# Patient Record
Sex: MALE | Race: WHITE | Employment: FULL TIME | URBAN - METROPOLITAN AREA
[De-identification: names, ages, dates, MRNs, and addresses within clinical notes are randomized per-mention and may not be internally consistent; named-entity substitution may affect disease eponyms.]

---

## 2017-10-04 ENCOUNTER — OFFICE VISIT (OUTPATIENT)
Dept: INTERNAL MEDICINE CLINIC | Age: 62
End: 2017-10-04

## 2017-10-04 VITALS
DIASTOLIC BLOOD PRESSURE: 74 MMHG | RESPIRATION RATE: 18 BRPM | HEART RATE: 80 BPM | WEIGHT: 203 LBS | HEIGHT: 75 IN | SYSTOLIC BLOOD PRESSURE: 122 MMHG | BODY MASS INDEX: 25.24 KG/M2

## 2017-10-04 DIAGNOSIS — Z23 NEED FOR INFLUENZA VACCINATION: ICD-10-CM

## 2017-10-04 DIAGNOSIS — Z12.83 SCREENING FOR SKIN CANCER: ICD-10-CM

## 2017-10-04 DIAGNOSIS — F51.02 ADJUSTMENT INSOMNIA: ICD-10-CM

## 2017-10-04 DIAGNOSIS — Z23 NEED FOR DIPHTHERIA-TETANUS-PERTUSSIS (TDAP) VACCINE, ADULT/ADOLESCENT: ICD-10-CM

## 2017-10-04 DIAGNOSIS — B00.2 PRIMARY HSV INFECTION OF MOUTH: Primary | ICD-10-CM

## 2017-10-04 DIAGNOSIS — D50.9 IRON DEFICIENCY ANEMIA, UNSPECIFIED: ICD-10-CM

## 2017-10-04 PROCEDURE — 99214 OFFICE O/P EST MOD 30 MIN: CPT | Performed by: INTERNAL MEDICINE

## 2017-10-04 PROCEDURE — 90471 IMMUNIZATION ADMIN: CPT | Performed by: INTERNAL MEDICINE

## 2017-10-04 PROCEDURE — 90472 IMMUNIZATION ADMIN EACH ADD: CPT | Performed by: INTERNAL MEDICINE

## 2017-10-04 PROCEDURE — 90688 IIV4 VACCINE SPLT 0.5 ML IM: CPT | Performed by: INTERNAL MEDICINE

## 2017-10-04 PROCEDURE — 90715 TDAP VACCINE 7 YRS/> IM: CPT | Performed by: INTERNAL MEDICINE

## 2017-10-04 RX ORDER — ZOLPIDEM TARTRATE 5 MG/1
5 TABLET ORAL NIGHTLY PRN
Qty: 30 TABLET | Refills: 0 | Status: SHIPPED | OUTPATIENT
Start: 2017-10-04 | End: 2018-09-04 | Stop reason: SDUPTHER

## 2017-10-04 RX ORDER — VALACYCLOVIR HYDROCHLORIDE 1 G/1
TABLET, FILM COATED ORAL
Qty: 30 TABLET | Refills: 0 | Status: SHIPPED | OUTPATIENT
Start: 2017-10-04 | End: 2018-09-28

## 2017-10-04 RX ORDER — ROSUVASTATIN CALCIUM 10 MG/1
10 TABLET, COATED ORAL DAILY
COMMUNITY

## 2017-10-04 NOTE — MR AVS SNAPSHOT
After Visit Summary             Evonne Cleaning   10/4/2017 11:30 AM   Office Visit    Description:  Male : 1955   Provider:  Mary Jane Vega MD   Department:  [facility]              Your Follow-Up and Future Appointments         Below is a list of your follow-up and future appointments. This may not be a complete list as you may have made appointments directly with providers that we are not aware of or your providers may have made some for you. Please call your providers to confirm appointments. It is important to keep your appointments. Please bring your current insurance card, photo ID, co-pay, and all medication bottles to your appointment. If self-pay, payment is expected at the time of service. Your To-Do List     Follow-Up    Return Physical in Dec.         Information from Your Visit        Department     Name Address Phone Fax    [address] [facility] [phone]      You Were Seen for:         Comments    Primary HSV infection of mouth   [[id number]]         Vital Signs     Blood Pressure Pulse Respirations Height Weight Body Mass Index    122/74 80 18 6' 3\" (1.905 m) 203 lb (92.1 kg) 25.37 kg/m2    Smoking Status                   Never Smoker           Additional Information about your Body Mass Index (BMI)           Your BMI as listed above is considered overweight (25.0-29.9). BMI is an estimate of body fat, calculated from your height and weight. The higher your BMI, the greater your risk of heart disease, high blood pressure, type 2 diabetes, stroke, gallstones, arthritis, sleep apnea, and certain cancers. BMI is not perfect. It may overestimate body fat in athletes and people who are more muscular. If your body fat is high you can improve your BMI by decreasing your calorie intake and becoming more physically active. Learn more at: Heart Health.co.uk             Today's Medication Changes          These changes are accurate as of: 10/4/17 12:04 PM.  If you have any questions, ask your nurse or doctor. STOP taking these medications           warfarin 10 MG tablet   Commonly known as:  COUMADIN   Stopped by:  Caitlin Beasley MD            Where to Get Your Medications      These medications were sent to [address] [facility], [address] [facility]  [address] [facility] [address]    Hours:  24-hours Phone:  [phone]     valACYclovir 1 g tablet    zolpidem 5 MG tablet               Your Current Medications Are              apixaban (ELIQUIS) 5 MG TABS tablet Take 5 mg by mouth 2 times daily    rosuvastatin (CRESTOR) 10 MG tablet Take 10 mg by mouth daily    valACYclovir (VALTREX) 1 g tablet TAKE 2 TABLET BY MOUTH BID    zolpidem (AMBIEN) 5 MG tablet Take 1 tablet by mouth nightly as needed for Sleep    ibuprofen (ADVIL;MOTRIN) 600 MG tablet Take 1 tablet by mouth every 6 hours as needed for Pain    irbesartan (AVAPRO) 150 MG tablet Take 75 mg by mouth daily Indications: Take 1/2 tablet daily     amitriptyline (ELAVIL) 10 MG tablet Take 10 mg by mouth daily. carvedilol (COREG CR) 40 MG CP24 Take 40 mg by mouth daily (with breakfast)     pantoprazole (PROTONIX) 40 MG tablet Take 40 mg by mouth daily. digoxin (LANOXIN) 0.25 MG tablet Take 125 mcg by mouth daily       Allergies           No Known Allergies      We Ordered/Performed the following           Lisbeth Hunter MD     Scheduling Instructions: [facility] - Chau Sosa MD  [address], [place], [address]  Ph: [phone]    One of the many advantages of the [address] is that we all work together closely to make healthcare easy for you.  We have contacted the physician practice to inform them we have referred you. For your convenience, their office should call you within a few days to schedule   an appointment, but if you would like to call them sooner their information is above. Comments: The patient can be scheduled with any member of the group, including the provider with the first available appointments. INFLUENZA, QUADV, 3 YRS AND OLDER, IM, MDV, 0.5ML ([place])     Tdap (age 10y-63y) IM (ADACEL)          Additional Information        Basic Information     Date Of Birth Sex Race Ethnicity Preferred Language    1955 Male White Non-/Non  English      Problem List as of 10/4/2017  Date Reviewed: 12/13/2016                Irritable bowel syndrome with diarrhea    Duran's esophagus without dysplasia    AICD (automatic cardioverter/defibrillator) present    Nonischemic cardiomyopathy ([facility] [address]    Hyperlipidemia, unspecified    Atrial fib/flutter, transient    History of mitral valve repair    Varicose veins    Acne    Iron deficiency anemia, unspecified    Primary HSV infection of mouth    GERD with stricture      Immunizations as of 10/4/2017     Name Date    Hepatitis A 8/23/2016    Influenza Virus Vaccine 12/7/2015, 12/19/2013    Pneumococcal Polysaccharide ([id number]) 12/13/2016    Tdap (Boostrix, Adacel) 7/18/2007    Zoster 12/23/2014      Preventive Care        Date Due    Diabetes Screening 4/23/1993    Tetanus Combination Vaccine (2 - Td) 7/18/2017    Yearly Flu Vaccine (1) 9/1/2017    Colon Cancer Stool Test 12/13/2017    Cholesterol Screening 8/14/2018            [employer] Signup           Our records indicate that you have an active [employer] account. You can view your After Visit Summary by going to [email or web address] and logging in with your [employer] username and password.       If you don't have a [employer] username and password but a parent or

## 2017-10-04 NOTE — PROGRESS NOTES
 Irritable bowel syndrome with diarrhea       No Known Allergies  Outpatient Prescriptions Marked as Taking for the 10/4/17 encounter (Office Visit) with Jennifer Rodriguez MD   Medication Sig Dispense Refill    apixaban (ELIQUIS) 5 MG TABS tablet Take 5 mg by mouth 2 times daily      rosuvastatin (CRESTOR) 10 MG tablet Take 10 mg by mouth daily      valACYclovir (VALTREX) 1 g tablet TAKE 2 TABLET BY MOUTH BID 30 tablet 0    zolpidem (AMBIEN) 5 MG tablet Take 1 tablet by mouth nightly as needed for Sleep 30 tablet 0    ibuprofen (ADVIL;MOTRIN) 600 MG tablet Take 1 tablet by mouth every 6 hours as needed for Pain 20 tablet 0    irbesartan (AVAPRO) 150 MG tablet Take 75 mg by mouth daily Indications: Take 1/2 tablet daily       amitriptyline (ELAVIL) 10 MG tablet Take 10 mg by mouth daily.  carvedilol (COREG CR) 40 MG CP24 Take 40 mg by mouth daily (with breakfast)       pantoprazole (PROTONIX) 40 MG tablet Take 40 mg by mouth daily.  digoxin (LANOXIN) 0.25 MG tablet Take 125 mcg by mouth daily            Review of Systems: 14 systems were negative except of what was stated on HPI    Nursing note and vitals reviewed. Vitals:    10/04/17 1145   BP: 122/74   Pulse: 80   Resp: 18   Weight: 203 lb (92.1 kg)   Height: 6' 3\" (1.905 m)     Wt Readings from Last 3 Encounters:   10/04/17 203 lb (92.1 kg)   07/11/17 205 lb 4 oz (93.1 kg)   12/13/16 205 lb (93 kg)     BP Readings from Last 3 Encounters:   10/04/17 122/74   07/12/17 107/63   12/13/16 122/84     Body mass index is 25.37 kg/(m^2). Constitutional: Patient appears well-developed and well-nourished. No distress. Head: Normocephalic and atraumatic. Neck: Normal range of motion. Neck supple. No thyroidmegaly. Cardiovascular: Normal rate, regular rhythm, normal heart sounds and intact distal pulses. Pulmonary/Chest: Effort normal and breath sounds normal. No stridor. No respiratory distress. No wheezes and no rales. Abdominal: Soft.  Bowel

## 2017-10-31 ENCOUNTER — OFFICE VISIT (OUTPATIENT)
Dept: INTERNAL MEDICINE CLINIC | Age: 62
End: 2017-10-31

## 2017-10-31 VITALS
RESPIRATION RATE: 18 BRPM | SYSTOLIC BLOOD PRESSURE: 138 MMHG | WEIGHT: 208 LBS | HEIGHT: 75 IN | HEART RATE: 78 BPM | DIASTOLIC BLOOD PRESSURE: 86 MMHG | BODY MASS INDEX: 25.86 KG/M2

## 2017-10-31 DIAGNOSIS — Z01.818 PREOP EXAM FOR INTERNAL MEDICINE: ICD-10-CM

## 2017-10-31 DIAGNOSIS — Z00.00 ANNUAL PHYSICAL EXAM: Primary | ICD-10-CM

## 2017-10-31 DIAGNOSIS — I42.8 NONISCHEMIC CARDIOMYOPATHY (HCC): ICD-10-CM

## 2017-10-31 DIAGNOSIS — I48.20 CHRONIC ATRIAL FIBRILLATION (HCC): ICD-10-CM

## 2017-10-31 DIAGNOSIS — Z98.890 HISTORY OF MITRAL VALVE REPAIR: ICD-10-CM

## 2017-10-31 DIAGNOSIS — K04.7 INFECTED TOOTH: ICD-10-CM

## 2017-10-31 PROCEDURE — 93000 ELECTROCARDIOGRAM COMPLETE: CPT | Performed by: INTERNAL MEDICINE

## 2017-10-31 PROCEDURE — 99244 OFF/OP CNSLTJ NEW/EST MOD 40: CPT | Performed by: INTERNAL MEDICINE

## 2017-10-31 NOTE — PROGRESS NOTES
No    Patient Active Problem List   Diagnosis    Atrial fib/flutter, transient    History of mitral valve repair    Varicose veins    Acne    Iron deficiency anemia    Primary HSV infection of mouth    GERD with stricture    AICD (automatic cardioverter/defibrillator) present    Nonischemic cardiomyopathy ([address]    Hyperlipidemia, unspecified    Duran's esophagus without dysplasia    Irritable bowel syndrome with diarrhea       Past Medical History:   Diagnosis Date    Acne     AICD (automatic cardioverter/defibrillator) present     Anemia     Atrial fib/flutter, transient     ED (erectile dysfunction)     ED (erectile dysfunction) 4/10/2012    GERD (gastroesophageal reflux disease)     Heart disease     Hyperlipidemia 12/19/2013    IBS (irritable bowel syndrome) 8/20/2015    MVP (mitral valve prolapse)     Primary HSV infection of mouth      Past Surgical History:   Procedure Laterality Date    CARDIAC VALVE SURGERY  1999    COLONOSCOPY  6/25/12    HEMORRHOID SURGERY  2015, 2016    hemorrhoid banding    VENTRICULAR ABLATION SURGERY  12/30/2015     Family History   Problem Relation Age of Onset    Heart Disease Mother      arrythmia    Heart Disease Father      MI, Mitral Valve, rheumatic fever    Heart Disease Brother      MVP and cardioverted     Social History     Social History    Marital status: Single     Spouse name: N/A    Number of children: N/A    Years of education: N/A     Occupational History    Not on file. Social History Main Topics    Smoking status: Never Smoker    Smokeless tobacco: Never Used    Alcohol use Yes      Comment: SOCIALLY    Drug use: No    Sexual activity: Yes     Partners: Female     Other Topics Concern    Not on file     Social History Narrative    No narrative on file       Review of Systems  A comprehensive review of systems was negative except for what was noted in the HPI.      Physical Exam   Constitutional: He is oriented to person, place, and time. He appears well-developed and well-nourished. No distress. HENT:   Head: Normocephalic and atraumatic. Mouth/Throat: Uvula is midline, oropharynx is clear and moist and mucous membranes are normal.   Eyes: Conjunctivae and EOM are normal. Pupils are equal, round, and reactive to light. Neck: Trachea normal and normal range of motion. Neck supple. No JVD present. Carotid bruit is not present. No mass and no thyromegaly present. Cardiovascular: Normal rate, regular rhythm, normal heart sounds and intact distal pulses. Exam reveals no gallop and no friction rub. No murmur heard. Pulmonary/Chest: Effort normal and breath sounds normal. No respiratory distress. He has no wheezes. He has no rales. Abdominal: Soft. Normal aorta and bowel sounds are normal. He exhibits no distension and no mass. There is no hepatosplenomegaly. No tenderness. Musculoskeletal: He exhibits no edema and no tenderness. Neurological: He is alert and oriented to person, place, and time. He has normal strength. No cranial nerve deficit or sensory deficit. Coordination and gait normal.   Skin: Skin is warm and dry. No rash noted. No erythema. Psychiatric: He has a normal mood and affect.  His behavior is normal.       Lab Results   Component Value Date    LABMICR YES 07/11/2017     Lab Results   Component Value Date     07/11/2017    K 4.1 07/11/2017    CL 99 07/11/2017    CO2 27 07/11/2017    BUN 10 07/11/2017    CREATININE 0.7 (L) 07/11/2017    GLUCOSE 106 (H) 07/11/2017    CALCIUM 9.2 07/11/2017     Lab Results   Component Value Date    LDLCALC 115 08/14/2013     Lab Results   Component Value Date    ALT 18 07/11/2017    AST 28 07/11/2017     Lab Results   Component Value Date    T4FREE 1.1 08/20/2015    T4FREE 0.98 11/21/2013     Lab Results   Component Value Date    WBC 8.7 07/11/2017    HGB 12.0 (L) 07/11/2017    HCT 36.0 (L) 07/11/2017    MCV 87.8 07/11/2017     07/11/2017     Lab Results   Component Value Date    INR 3.3 11/04/2013      Lab Results   Component Value Date    PSA 1.34 12/13/2016    PSA 1.51 12/07/2015    PSA 1.30 12/19/2013      No results found for: LABURIC        Assessment:       57 y.o. patient with planned surgery as above. SURGERY SPECIFIC RISK:  none  Known risk factors for perioperative complications: Chronic A. Fib with defibrillator  Current medications which may produce withdrawal symptoms if withheld perioperatively: none      Plan:     1. Preoperative workup as follows: ECG  2. Change in medication regimen before surgery: no Eliquis 2 days prior to surgery per cardiologist  3. Prophylaxis for cardiac events with perioperative beta-blockers: Not indicated  ACC/AHA indications for pre-operative beta-blocker use:    · Vascular surgery with history of postitive stress test  · Intermediate or high risk surgery with history of CAD   · Intermediate or high risk surgery with multiple clinical predictors of CAD- 2 of the following: history of compensated or prior heart failure, history of cerebrovascular disease, DM, or renal insufficiency    Routine administration of higher-dose, long-acting metoprolol in beta-blockernaïve patients on the day of surgery, and in the absence of dose titration is associated with an overall increase in mortality. Beta-blockers should be started days to weeks prior to surgery and titrated to pulse < 70.  4. Deep vein thrombosis prophylaxis: regimen to be chosen by surgical team  5.  No contraindications to planned surgery if cleared by cardiologist    Barry Walton was seen today for pre-op exam.    Diagnoses and all orders for this visit:    Preop exam for internal medicine  -     EKG 12 Lead    Infected tooth    Chronic atrial fibrillation (HCC)  -     EKG 12 Lead    History of mitral valve repair  -     EKG 12 Lead    Nonischemic cardiomyopathy ([facility] [address]  -     EKG 12 Lead              ALDO PAN M.D.  [facility]  Office: [phone]

## 2017-10-31 NOTE — PATIENT INSTRUCTIONS
Preventive plan of care for Vincent Barrientos        10/31/2017           Preventive Measures Status       Recommendations for screening       Colon Cancer Screen  Colonoscopy Test is due 2022   Diabetes Screen  Glucose (mg/dL)   Date Value   07/11/2017 106 (H)    Repeat yearly   Cholesterol Screen  Lab Results   Component Value Date    LDLCALC 115 08/14/2013    Repeat yearly   Aspirin for Cardiovascular Prevention   No Contraindicated due to Eliquis   Weight: Body mass index is 26 kg/m².   6' 3\" (1.905 m)208 lb (94.3 kg)  Your BMI is 25 or greater, which indicates that you are overweight   Living Will: Yes Copy requested    Recommended Immunizations    Immunization History   Administered Date(s) Administered    Hepatitis A 08/23/2016    Influenza Virus Vaccine 12/19/2013, 12/07/2015    Influenza, Shandra Nur, 3 Years and older, IM 10/04/2017    Pneumococcal Polysaccharide ([id number]) 12/13/2016    Tdap (Boostrix, Adacel) 07/18/2007, 10/04/2017    Zoster 12/23/2014    Influenza vaccine:  recommended every fall         Other Recommendations ·   See a dentist every 6 months  · Try to get at least 30 minutes of exercise 3-5 days per week  · Always wear a seat belt when traveling in a car  · Always wear a helmet when riding a bicycle or motorcycle  · When exposed to the sun, use a sunscreen that protects against both UVA and UVB radiation with an SPF of 30 or greater- reapply every 2 to 3 hours or after sweating, drying off with a towel, or swimming

## 2017-10-31 NOTE — PROGRESS NOTES
[facility]  History and Physical  Esperanza Crane M.D. Maria Esther Etienne  YOB: 1955    Date of Service:  10/31/2017    Chief Complaint:   Maria Esther Etienne is a 57 y.o. male who presents for   Chief Complaint   Patient presents with    Pre-op Exam     Tooth Extraction on 11/6/17 Nathan Szymanski        HPI: Here for Annual Physical and Follow up.     Lab Results   Component Value Date    LABMICR YES 07/11/2017     Lab Results   Component Value Date     07/11/2017    K 4.1 07/11/2017    CL 99 07/11/2017    CO2 27 07/11/2017    BUN 10 07/11/2017    CREATININE 0.7 (L) 07/11/2017    GLUCOSE 106 (H) 07/11/2017    CALCIUM 9.2 07/11/2017     Lab Results   Component Value Date    LDLCALC 115 08/14/2013     Lab Results   Component Value Date    ALT 18 07/11/2017    AST 28 07/11/2017     Lab Results   Component Value Date    T4FREE 1.1 08/20/2015    T4FREE 0.98 11/21/2013     Lab Results   Component Value Date    WBC 8.7 07/11/2017    HGB 12.0 (L) 07/11/2017    HCT 36.0 (L) 07/11/2017    MCV 87.8 07/11/2017     07/11/2017     Lab Results   Component Value Date    INR 3.3 11/04/2013      Lab Results   Component Value Date    PSA 1.34 12/13/2016    PSA 1.51 12/07/2015    PSA 1.30 12/19/2013      No results found for: LABURIC     Wt Readings from Last 3 Encounters:   10/31/17 208 lb (94.3 kg)   10/04/17 203 lb (92.1 kg)   07/11/17 205 lb 4 oz (93.1 kg)     BP Readings from Last 3 Encounters:   10/31/17 138/86   10/04/17 122/74   07/12/17 107/63       Patient Active Problem List   Diagnosis    Atrial fib/flutter, transient    History of mitral valve repair    Varicose veins    Acne    Iron deficiency anemia    Primary HSV infection of mouth    GERD with stricture    AICD (automatic cardioverter/defibrillator) present    Nonischemic cardiomyopathy ([address]    Hyperlipidemia, unspecified    Duran's esophagus without dysplasia    Irritable bowel syndrome with diarrhea       No Known Allergies  Outpatient Prescriptions Marked as Taking for the 10/31/17 encounter (Office Visit) with Kateryna Beasley MD   Medication Sig Dispense Refill    apixaban (ELIQUIS) 5 MG TABS tablet Take 5 mg by mouth 2 times daily      rosuvastatin (CRESTOR) 10 MG tablet Take 10 mg by mouth daily      valACYclovir (VALTREX) 1 g tablet TAKE 2 TABLET BY MOUTH BID 30 tablet 0    zolpidem (AMBIEN) 5 MG tablet Take 1 tablet by mouth nightly as needed for Sleep 30 tablet 0    irbesartan (AVAPRO) 150 MG tablet Take 75 mg by mouth daily Indications: Take 1/2 tablet daily       amitriptyline (ELAVIL) 10 MG tablet Take 10 mg by mouth daily.  carvedilol (COREG CR) 40 MG CP24 Take 40 mg by mouth daily (with breakfast)       pantoprazole (PROTONIX) 40 MG tablet Take 40 mg by mouth daily.  digoxin (LANOXIN) 0.25 MG tablet Take 125 mcg by mouth daily          Past Medical History:   Diagnosis Date    Acne     AICD (automatic cardioverter/defibrillator) present     Anemia     Atrial fib/flutter, transient     ED (erectile dysfunction)     ED (erectile dysfunction) 4/10/2012    GERD (gastroesophageal reflux disease)     Heart disease     Hyperlipidemia 12/19/2013    IBS (irritable bowel syndrome) 8/20/2015    MVP (mitral valve prolapse)     Primary HSV infection of mouth      Past Surgical History:   Procedure Laterality Date    CARDIAC VALVE SURGERY  1999    COLONOSCOPY  6/25/12    HEMORRHOID SURGERY  2015, 2016    hemorrhoid banding    VENTRICULAR ABLATION SURGERY  12/30/2015     Family History   Problem Relation Age of Onset    Heart Disease Mother      arrythmia    Heart Disease Father      MI, Mitral Valve, rheumatic fever    Heart Disease Brother      MVP and cardioverted     Social History     Social History    Marital status: Single     Spouse name: N/A    Number of children: N/A    Years of education: N/A     Occupational History    Not on file.      Social History Main Topics    Smoking

## 2017-11-06 ENCOUNTER — HOSPITAL ENCOUNTER (OUTPATIENT)
Dept: SURGERY | Age: 62
Discharge: HOME | End: 2017-11-06
Attending: ORAL & MAXILLOFACIAL SURGERY | Admitting: ORAL & MAXILLOFACIAL SURGERY

## 2017-11-06 VITALS
WEIGHT: 208 LBS | SYSTOLIC BLOOD PRESSURE: 114 MMHG | HEIGHT: 76 IN | OXYGEN SATURATION: 99 % | DIASTOLIC BLOOD PRESSURE: 64 MMHG | TEMPERATURE: 97.2 F | BODY MASS INDEX: 25.33 KG/M2 | RESPIRATION RATE: 16 BRPM | HEART RATE: 52 BPM

## 2017-11-06 DIAGNOSIS — K08.9 DISORDER OF TEETH OR SUPPORTING STRUCTURES: ICD-10-CM

## 2017-11-06 RX ORDER — MORPHINE SULFATE 2 MG/ML
1 INJECTION, SOLUTION INTRAMUSCULAR; INTRAVENOUS EVERY 5 MIN PRN
Status: DISCONTINUED | OUTPATIENT
Start: 2017-11-06 | End: 2017-11-07 | Stop reason: HOSPADM

## 2017-11-06 RX ORDER — HYDRALAZINE HYDROCHLORIDE 20 MG/ML
5 INJECTION INTRAMUSCULAR; INTRAVENOUS EVERY 10 MIN PRN
Status: DISCONTINUED | OUTPATIENT
Start: 2017-11-06 | End: 2017-11-07 | Stop reason: HOSPADM

## 2017-11-06 RX ORDER — SODIUM CHLORIDE 0.9 % (FLUSH) 0.9 %
10 SYRINGE (ML) INJECTION EVERY 12 HOURS SCHEDULED
Status: DISCONTINUED | OUTPATIENT
Start: 2017-11-06 | End: 2017-11-07 | Stop reason: HOSPADM

## 2017-11-06 RX ORDER — AMOXICILLIN 500 MG/1
2000 CAPSULE ORAL ONCE
COMMUNITY
End: 2017-11-06 | Stop reason: HOSPADM

## 2017-11-06 RX ORDER — OXYCODONE HYDROCHLORIDE 5 MG/1
5 TABLET ORAL PRN
Status: ACTIVE | OUTPATIENT
Start: 2017-11-06 | End: 2017-11-06

## 2017-11-06 RX ORDER — MEPERIDINE HYDROCHLORIDE 50 MG/ML
12.5 INJECTION INTRAMUSCULAR; INTRAVENOUS; SUBCUTANEOUS EVERY 5 MIN PRN
Status: DISCONTINUED | OUTPATIENT
Start: 2017-11-06 | End: 2017-11-07 | Stop reason: HOSPADM

## 2017-11-06 RX ORDER — METOCLOPRAMIDE HYDROCHLORIDE 5 MG/ML
10 INJECTION INTRAMUSCULAR; INTRAVENOUS
Status: ACTIVE | OUTPATIENT
Start: 2017-11-06 | End: 2017-11-06

## 2017-11-06 RX ORDER — PROMETHAZINE HYDROCHLORIDE 25 MG/ML
6.25 INJECTION, SOLUTION INTRAMUSCULAR; INTRAVENOUS
Status: ACTIVE | OUTPATIENT
Start: 2017-11-06 | End: 2017-11-06

## 2017-11-06 RX ORDER — LABETALOL HYDROCHLORIDE 5 MG/ML
5 INJECTION, SOLUTION INTRAVENOUS EVERY 10 MIN PRN
Status: DISCONTINUED | OUTPATIENT
Start: 2017-11-06 | End: 2017-11-07 | Stop reason: HOSPADM

## 2017-11-06 RX ORDER — OXYMETAZOLINE HYDROCHLORIDE 0.05 G/100ML
2 SPRAY NASAL
Status: COMPLETED | OUTPATIENT
Start: 2017-11-06 | End: 2017-11-06

## 2017-11-06 RX ORDER — OXYCODONE HYDROCHLORIDE 5 MG/1
10 TABLET ORAL PRN
Status: ACTIVE | OUTPATIENT
Start: 2017-11-06 | End: 2017-11-06

## 2017-11-06 RX ORDER — LIDOCAINE HYDROCHLORIDE 10 MG/ML
1 INJECTION, SOLUTION EPIDURAL; INFILTRATION; INTRACAUDAL; PERINEURAL
Status: ACTIVE | OUTPATIENT
Start: 2017-11-06 | End: 2017-11-06

## 2017-11-06 RX ORDER — MINOCYCLINE HYDROCHLORIDE 50 MG/1
CAPSULE ORAL
Refills: 5 | COMMUNITY
Start: 2017-10-26 | End: 2017-11-06 | Stop reason: HOSPADM

## 2017-11-06 RX ORDER — SODIUM CHLORIDE, SODIUM LACTATE, POTASSIUM CHLORIDE, CALCIUM CHLORIDE 600; 310; 30; 20 MG/100ML; MG/100ML; MG/100ML; MG/100ML
INJECTION, SOLUTION INTRAVENOUS CONTINUOUS
Status: DISCONTINUED | OUTPATIENT
Start: 2017-11-06 | End: 2017-11-07 | Stop reason: HOSPADM

## 2017-11-06 RX ORDER — DIPHENHYDRAMINE HYDROCHLORIDE 50 MG/ML
12.5 INJECTION INTRAMUSCULAR; INTRAVENOUS
Status: ACTIVE | OUTPATIENT
Start: 2017-11-06 | End: 2017-11-06

## 2017-11-06 RX ORDER — SODIUM CHLORIDE 0.9 % (FLUSH) 0.9 %
10 SYRINGE (ML) INJECTION PRN
Status: DISCONTINUED | OUTPATIENT
Start: 2017-11-06 | End: 2017-11-07 | Stop reason: HOSPADM

## 2017-11-06 RX ADMIN — SODIUM CHLORIDE, SODIUM LACTATE, POTASSIUM CHLORIDE, CALCIUM CHLORIDE: 600; 310; 30; 20 INJECTION, SOLUTION INTRAVENOUS at 10:01

## 2017-11-06 RX ADMIN — OXYMETAZOLINE HYDROCHLORIDE 2 SPRAY: 0.05 SPRAY NASAL at 10:03

## 2017-11-06 ASSESSMENT — PAIN SCALES - GENERAL
PAINLEVEL_OUTOF10: 0

## 2017-11-06 ASSESSMENT — PAIN - FUNCTIONAL ASSESSMENT: PAIN_FUNCTIONAL_ASSESSMENT: 0-10

## 2017-11-06 NOTE — BRIEF OP NOTE
Brief Postoperative Note    Leonela Chapa  YOB: 1955  [id number]    Pre-operative Diagnosis: non salvageable 15    Post-operative Diagnosis: Same    Procedure: surgical extraction 15    Anesthesia: General    Surgeons/Assistants: rios    Estimated Blood Loss: less than 50     Complications: None    Specimens: Was Not Obtained    Findings: unremarkable    Electronically signed by Tevin Marcos MD on 11/6/2017 at 11:15 AM

## 2017-11-06 NOTE — H&P
I have reviewed the history and physical and examined the patient and find no relevant changes. I have reviewed with the patient and/or family the risks, benefits, and alternatives to the procedure.

## 2017-11-06 NOTE — ANESTHESIA PRE-OP
injection 5 mg  5 mg Intravenous Q10 Min PRN Isidro Bashir MD        hydrALAZINE (APRESOLINE) injection 5 mg  5 mg Intravenous Q10 Min PRN Isidro Bashir MD        meperidine (DEMEROL) injection 12.5 mg  12.5 mg Intravenous Q5 Min PRN Isidro Bashir MD        oxymetazoline (AFRIN) 0.05 % nasal spray 2 spray  2 spray Each Nare On Call to Via Thierry Umaña MD           Allergies:  No Known Allergies    Problem List:    Patient Active Problem List   Diagnosis Code    Chronic atrial fibrillation ([facility] [address] I48.2    History of mitral valve repair Z98.890    Varicose veins I83.90    Acne L70.9    Iron deficiency anemia D50.9    Primary HSV infection of mouth B00.2    GERD with stricture K21.9, K22.2    AICD (automatic cardioverter/defibrillator) present Z95.810    Nonischemic cardiomyopathy ([facility] [address] I42.8    Hyperlipidemia, unspecified E78.5    Duran's esophagus without dysplasia K22.70    Irritable bowel syndrome with diarrhea K58.0       Past Medical History:        Diagnosis Date    Acne     AICD (automatic cardioverter/defibrillator) present     Anemia     Atrial fibrillation ([facility] [address]     ED (erectile dysfunction) 4/10/2012    GERD (gastroesophageal reflux disease)     Heart disease     IBS (irritable bowel syndrome) 8/20/2015    MVP (mitral valve prolapse)     PONV (postoperative nausea and vomiting)     Primary HSV infection of mouth        Past Surgical History:        Procedure Laterality Date    CARDIAC DEFIBRILLATOR PLACEMENT  2005    pacer combination    CARDIAC VALVE SURGERY  1999    COLONOSCOPY  6/25/12    HEMORRHOID SURGERY  2015, 2016    hemorrhoid banding    VENTRICULAR ABLATION SURGERY  2002 X 2, 2003 X 2, 2015 X1       Social History:    Social History   Substance Use Topics    Smoking status: Never Smoker    Smokeless tobacco: Never Used    Alcohol use 0.0 - 0.6 oz/week      Comment: SOCIALLY                                Counseling given: Not Answered      Vital Signs (Current):   Vitals:    11/06/17 0948   BP: 135/85   Pulse: 71   Resp: 20   Temp: 98.1 °F (36.7 °C)   SpO2: 100%   Weight: 208 lb (94.3 kg)   Height: 6' 4\" (1.93 m)                                              BP Readings from Last 3 Encounters:   11/06/17 135/85   10/31/17 138/86   10/04/17 122/74       NPO Status: Time of last liquid consumption: 0900                        Time of last solid consumption: 2000                        Date of last liquid consumption: 11/06/17 (sip water with meds)                        Date of last solid food consumption: 11/05/17    BMI:   Wt Readings from Last 3 Encounters:   11/06/17 208 lb (94.3 kg)   11/01/17 208 lb (94.3 kg)   10/31/17 208 lb (94.3 kg)     Body mass index is 25.32 kg/m². Anesthesia Evaluation  Patient summary reviewed and Nursing notes reviewed history of anesthetic complications:   Airway: Mallampati: II  TM distance: >3 FB   Neck ROM: full  Mouth opening: > = 3 FB Dental:          Pulmonary:negative ROS                              Cardiovascular:    (+) valvular problems/murmurs (s/p mitral valve replacement with resultant afib and AICD insertion):, pacemaker: AICD,                   Neuro/Psych:   {neg ROS              GI/Hepatic/Renal:   (+) GERD:,           Endo/Other: negative ROS                    Abdominal:           Vascular:                                      Anesthesia Plan      general     ASA 1    (20-year-old male presents for extraction of #15 tooth. Plan general anesthesia with ASA standard monitors. Questions answered. Patient agreeable with anesthetic plan.  )  Induction: intravenous. Anesthetic plan and risks discussed with patient. Plan discussed with CRNA.     Attending anesthesiologist reviewed and agrees with Pre Eval content        Hien Venegas MD   11/6/2017

## 2017-11-06 NOTE — ANESTHESIA POST-OP
Postoperative Anesthesia Note    Name:    Radha Rivera  MRN:      [id number]    Patient Vitals for the past 12 hrs:   BP Temp Temp src Pulse Resp SpO2 Height Weight   11/06/17 1143 114/64 - - 52 16 99 % - -   11/06/17 1130 (!) 106/55 97.2 °F (36.2 °C) - 58 16 95 % - -   11/06/17 1122 (!) 104/55 - - 53 16 93 % - -   11/06/17 1117 114/61 - - 61 15 90 % - -   11/06/17 1112 121/68 96.8 °F (36 °C) Temporal 60 16 92 % - -   11/06/17 0948 135/85 98.1 °F (36.7 °C) - 71 20 100 % 6' 4\" (1.93 m) 208 lb (94.3 kg)        LABS:    CBC  Lab Results   Component Value Date/Time    WBC 8.7 07/11/2017 10:02 PM    HGB 12.0 (L) 07/11/2017 10:02 PM    HCT 36.0 (L) 07/11/2017 10:02 PM     07/11/2017 10:02 PM     RENAL  Lab Results   Component Value Date/Time     07/11/2017 10:02 PM    K 4.1 07/11/2017 10:02 PM    CL 99 07/11/2017 10:02 PM    CO2 27 07/11/2017 10:02 PM    BUN 10 07/11/2017 10:02 PM    CREATININE 0.7 (L) 07/11/2017 10:02 PM    GLUCOSE 106 (H) 07/11/2017 10:02 PM     COAGS  Lab Results   Component Value Date/Time    INR 3.3 11/04/2013 01:09 PM       Intake & Output: In: [address] [[address]; [address]]  Out: -     Nausea & Vomiting:  No    Level of Consciousness:  Awake    Pain Assessment:  Adequate analgesia    Anesthesia Complications:  No apparent anesthetic complications    SUMMARY      Vital signs stable  OK to discharge from Stage I post anesthesia care.   Care transferred from Anesthesiology department on discharge from perioperative area

## 2018-09-04 ENCOUNTER — OFFICE VISIT (OUTPATIENT)
Dept: INTERNAL MEDICINE CLINIC | Age: 63
End: 2018-09-04

## 2018-09-04 VITALS
SYSTOLIC BLOOD PRESSURE: 110 MMHG | DIASTOLIC BLOOD PRESSURE: 80 MMHG | HEIGHT: 76 IN | BODY MASS INDEX: 24.18 KG/M2 | WEIGHT: 198.6 LBS | OXYGEN SATURATION: 97 % | HEART RATE: 66 BPM

## 2018-09-04 DIAGNOSIS — F51.02 ADJUSTMENT INSOMNIA: ICD-10-CM

## 2018-09-04 DIAGNOSIS — J18.9 PNEUMONIA OF LEFT LOWER LOBE DUE TO INFECTIOUS ORGANISM: Primary | ICD-10-CM

## 2018-09-04 PROCEDURE — 4040F PNEUMOC VAC/ADMIN/RCVD: CPT | Performed by: INTERNAL MEDICINE

## 2018-09-04 PROCEDURE — 99213 OFFICE O/P EST LOW 20 MIN: CPT | Performed by: INTERNAL MEDICINE

## 2018-09-04 PROCEDURE — 1036F TOBACCO NON-USER: CPT | Performed by: INTERNAL MEDICINE

## 2018-09-04 PROCEDURE — 1123F ACP DISCUSS/DSCN MKR DOCD: CPT | Performed by: INTERNAL MEDICINE

## 2018-09-04 PROCEDURE — 1101F PT FALLS ASSESS-DOCD LE1/YR: CPT | Performed by: INTERNAL MEDICINE

## 2018-09-04 PROCEDURE — G8420 CALC BMI NORM PARAMETERS: HCPCS | Performed by: INTERNAL MEDICINE

## 2018-09-04 PROCEDURE — 3017F COLORECTAL CA SCREEN DOC REV: CPT | Performed by: INTERNAL MEDICINE

## 2018-09-04 PROCEDURE — G8427 DOCREV CUR MEDS BY ELIG CLIN: HCPCS | Performed by: INTERNAL MEDICINE

## 2018-09-04 RX ORDER — PROMETHAZINE HYDROCHLORIDE AND CODEINE PHOSPHATE 6.25; 1 MG/5ML; MG/5ML
5 SYRUP ORAL EVERY 4 HOURS PRN
Qty: 180 ML | Refills: 0 | Status: SHIPPED | OUTPATIENT
Start: 2018-09-04 | End: 2018-09-11

## 2018-09-04 RX ORDER — ZOLPIDEM TARTRATE 5 MG/1
5 TABLET ORAL NIGHTLY PRN
Qty: 30 TABLET | Refills: 0 | Status: SHIPPED | OUTPATIENT
Start: 2018-09-04 | End: 2019-10-03 | Stop reason: SDUPTHER

## 2018-09-04 NOTE — PROGRESS NOTES
Medication Sig Dispense Refill    apixaban (ELIQUIS) 5 MG TABS tablet Take 5 mg by mouth 2 times daily      rosuvastatin (CRESTOR) 10 MG tablet Take 10 mg by mouth daily      valACYclovir (VALTREX) 1 g tablet TAKE 2 TABLET BY MOUTH BID 30 tablet 0    zolpidem (AMBIEN) 5 MG tablet Take 1 tablet by mouth nightly as needed for Sleep 30 tablet 0    irbesartan (AVAPRO) 150 MG tablet Take 75 mg by mouth daily Indications: Take 1/2 tablet daily       amitriptyline (ELAVIL) 10 MG tablet Take 10 mg by mouth daily.  carvedilol (COREG CR) 40 MG CP24 Take 40 mg by mouth daily (with breakfast)       pantoprazole (PROTONIX) 40 MG tablet Take 40 mg by mouth daily.  digoxin (LANOXIN) 0.25 MG tablet Take 125 mcg by mouth daily            Review of Systems: 14 systems were negative except of what was stated on HPI    Nursing note and vitals reviewed. Vitals:    09/04/18 1323   BP: 110/80   Site: Right Arm   Position: Sitting   Cuff Size: Medium Adult   Pulse: 66   SpO2: 97%   Weight: 198 lb 9.6 oz (90.1 kg)   Height: 6' 4\" (1.93 m)     Wt Readings from Last 3 Encounters:   09/04/18 198 lb 9.6 oz (90.1 kg)   11/06/17 208 lb (94.3 kg)   11/01/17 208 lb (94.3 kg)     BP Readings from Last 3 Encounters:   09/04/18 110/80   11/06/17 114/64   10/31/17 138/86     Body mass index is 24.17 kg/m². Constitutional: Patient appears well-developed and well-nourished. No distress. Head: Normocephalic and atraumatic. Neck: Normal range of motion. Neck supple. No thyroidmegaly. Cardiovascular: Normal rate, regular rhythm, normal heart sounds and intact distal pulses. Pulmonary/Chest: Effort normal and breath sounds normal. No stridor. No respiratory distress. No wheezes and no rales. Abdominal: Soft. Bowel sounds are normal. No distension and no mass. No tenderness. No rebound and no guarding. Musculoskeletal: No edema and no tenderness. Skin: No rash or erythema. Psychiatric: Normal mood and affect.  Behavior is normal.     Assessment/Plan:  Merna Mora was seen today for follow-up from hospital.    Diagnoses and all orders for this visit:    Pneumonia of left lower lobe due to infectious organism ([address]  -     promethazine-codeine (PHENERGAN WITH CODEINE) 6.25-10 MG/5ML syrup; Take 5 mLs by mouth every 4 hours as needed for Cough for up to 7 days. .    Adjustment insomnia  -     zolpidem (AMBIEN) 5 MG tablet; Take 1 tablet by mouth nightly as needed for Sleep for up to 350 days. .        Return Welcome to [facility] within 1 month.

## 2018-09-05 ENCOUNTER — TELEPHONE (OUTPATIENT)
Dept: INTERNAL MEDICINE CLINIC | Age: 63
End: 2018-09-05

## 2018-09-05 NOTE — TELEPHONE ENCOUNTER
Patient wants to know if should take mucinex along with his prescription to help with mucous? Also should he be on a zpak?

## 2018-09-13 ENCOUNTER — OFFICE VISIT (OUTPATIENT)
Dept: INTERNAL MEDICINE CLINIC | Age: 63
End: 2018-09-13

## 2018-09-13 VITALS
DIASTOLIC BLOOD PRESSURE: 84 MMHG | WEIGHT: 197.6 LBS | HEIGHT: 76 IN | BODY MASS INDEX: 24.06 KG/M2 | SYSTOLIC BLOOD PRESSURE: 118 MMHG | OXYGEN SATURATION: 93 % | HEART RATE: 76 BPM

## 2018-09-13 DIAGNOSIS — J98.01 POST-INFECTION BRONCHOSPASM: ICD-10-CM

## 2018-09-13 DIAGNOSIS — Z23 NEED FOR STREPTOCOCCUS PNEUMONIAE VACCINATION: ICD-10-CM

## 2018-09-13 DIAGNOSIS — Z23 NEED FOR SHINGLES VACCINE: ICD-10-CM

## 2018-09-13 DIAGNOSIS — Z23 NEED FOR INFLUENZA VACCINATION: ICD-10-CM

## 2018-09-13 DIAGNOSIS — Z00.00 ROUTINE GENERAL MEDICAL EXAMINATION AT A HEALTH CARE FACILITY: ICD-10-CM

## 2018-09-13 DIAGNOSIS — J02.9 PHARYNGITIS, UNSPECIFIED ETIOLOGY: ICD-10-CM

## 2018-09-13 DIAGNOSIS — Z00.00 MEDICARE WELCOME EXAM: Primary | ICD-10-CM

## 2018-09-13 LAB — S PYO AG THROAT QL: NORMAL

## 2018-09-13 PROCEDURE — G8420 CALC BMI NORM PARAMETERS: HCPCS | Performed by: INTERNAL MEDICINE

## 2018-09-13 PROCEDURE — G8427 DOCREV CUR MEDS BY ELIG CLIN: HCPCS | Performed by: INTERNAL MEDICINE

## 2018-09-13 PROCEDURE — 4040F PNEUMOC VAC/ADMIN/RCVD: CPT | Performed by: INTERNAL MEDICINE

## 2018-09-13 PROCEDURE — 87880 STREP A ASSAY W/OPTIC: CPT | Performed by: INTERNAL MEDICINE

## 2018-09-13 PROCEDURE — 99213 OFFICE O/P EST LOW 20 MIN: CPT | Performed by: INTERNAL MEDICINE

## 2018-09-13 PROCEDURE — 1123F ACP DISCUSS/DSCN MKR DOCD: CPT | Performed by: INTERNAL MEDICINE

## 2018-09-13 PROCEDURE — 1101F PT FALLS ASSESS-DOCD LE1/YR: CPT | Performed by: INTERNAL MEDICINE

## 2018-09-13 PROCEDURE — 1036F TOBACCO NON-USER: CPT | Performed by: INTERNAL MEDICINE

## 2018-09-13 PROCEDURE — G0402 INITIAL PREVENTIVE EXAM: HCPCS | Performed by: INTERNAL MEDICINE

## 2018-09-13 PROCEDURE — 3017F COLORECTAL CA SCREEN DOC REV: CPT | Performed by: INTERNAL MEDICINE

## 2018-09-13 RX ORDER — ALBUTEROL SULFATE 90 UG/1
2 AEROSOL, METERED RESPIRATORY (INHALATION) EVERY 6 HOURS PRN
Qty: 1 INHALER | Refills: 0 | Status: SHIPPED | OUTPATIENT
Start: 2018-09-13

## 2018-09-13 ASSESSMENT — ANXIETY QUESTIONNAIRES: GAD7 TOTAL SCORE: 0

## 2018-09-13 ASSESSMENT — PATIENT HEALTH QUESTIONNAIRE - PHQ9
SUM OF ALL RESPONSES TO PHQ QUESTIONS 1-9: 0
SUM OF ALL RESPONSES TO PHQ QUESTIONS 1-9: 0

## 2018-09-13 ASSESSMENT — LIFESTYLE VARIABLES: HOW OFTEN DO YOU HAVE A DRINK CONTAINING ALCOHOL: 0

## 2018-09-13 NOTE — PROGRESS NOTES
non-slip mats in all bathtubs?: (!) No (no bathtub)  Do all of your stairways have a railing or banister?: Yes  Are your doorways, halls and stairs free of clutter?: Yes  Do you always fasten your seatbelt when you are in a car?: Yes  Safety Interventions:  · None indicated    Personalized Preventive Plan   Current Health Maintenance Status  Immunization History   Administered Date(s) Administered    Hepatitis A 08/23/2016    Influenza Virus Vaccine 12/19/2013, 12/07/2015    Influenza, Patti Mustache, 3 Years and older, IM (Fluzone 3 yrs and older or Afluria 5 yrs and older) 10/04/2017    Pneumococcal Polysaccharide ([id number]) 12/13/2016    Tdap (Boostrix, Adacel) 07/18/2007, 10/04/2017    Zoster Live (Zostavax) 12/23/2014        Health Maintenance   Topic Date Due    Shingles Vaccine (1 of 2 - 2 Dose Series) 02/23/2015    Pneumococcal low/med risk (1 of 2 - PCV13) 04/23/2018    Potassium monitoring  07/11/2018    Creatinine monitoring  07/11/2018    Flu vaccine (1) 09/01/2018    Lipid screen  08/14/2018    Colon cancer screen colonoscopy  06/25/2023    DTaP/Tdap/Td vaccine (3 - Td) 10/04/2027    Hepatitis C screen  Completed    HIV screen  Completed     Recommendations for Preventive Services Due: see orders and patient instructions/AVS.  .   Recommended screening schedule for the next 5-10 years is provided to the patient in written form: see Patient Instructions/AVS.

## 2018-09-13 NOTE — PATIENT INSTRUCTIONS
Personalized Preventive Plan for Tami Sims - 9/13/2018  Medicare offers a range of preventive health benefits. Some of the tests and screenings are paid in full while other may be subject to a deductible, co-insurance, and/or copay. Some of these benefits include a comprehensive review of your medical history including lifestyle, illnesses that may run in your family, and various assessments and screenings as appropriate. After reviewing your medical record and screening and assessments performed today your provider may have ordered immunizations, labs, imaging, and/or referrals for you. A list of these orders (if applicable) as well as your Preventive Care list are included within your After Visit Summary for your review. Other Preventive Recommendations:    · A preventive eye exam performed by an eye specialist is recommended every 1-2 years to screen for glaucoma; cataracts, macular degeneration, and other eye disorders. · A preventive dental visit is recommended every 6 months. · Try to get at least 150 minutes of exercise per week or 10,000 steps per day on a pedometer . · Order or download the FREE \"Exercise & Physical Activity: Your Everyday Guide\" from [employer]. Call 0-964.454.6856 or search [employer] online. · You need 5717-3904 mg of calcium and 6270-8340 IU of vitamin D per day. It is possible to meet your calcium requirement with diet alone, but a vitamin D supplement is usually necessary to meet this goal.  · When exposed to the sun, use a sunscreen that protects against both UVA and UVB radiation with an SPF of 30 or greater. Reapply every 2 to 3 hours or after sweating, drying off with a towel, or swimming. · Always wear a seat belt when traveling in a car. Always wear a helmet when riding a bicycle or motorcycle.

## 2018-09-28 ENCOUNTER — ANESTHESIA EVENT (OUTPATIENT)
Dept: OPERATING ROOM | Age: 63
End: 2018-09-28
Payer: MEDICARE

## 2018-10-03 ENCOUNTER — HOSPITAL ENCOUNTER (OUTPATIENT)
Age: 63
Setting detail: OUTPATIENT SURGERY
Discharge: HOME | End: 2018-10-03
Attending: ORAL & MAXILLOFACIAL SURGERY | Admitting: ORAL & MAXILLOFACIAL SURGERY
Payer: MEDICARE

## 2018-10-03 ENCOUNTER — ANESTHESIA (OUTPATIENT)
Dept: OPERATING ROOM | Age: 63
End: 2018-10-03
Payer: MEDICARE

## 2018-10-03 VITALS
RESPIRATION RATE: 15 BRPM | HEART RATE: 59 BPM | WEIGHT: 197 LBS | TEMPERATURE: 96.8 F | SYSTOLIC BLOOD PRESSURE: 126 MMHG | OXYGEN SATURATION: 98 % | HEIGHT: 76 IN | BODY MASS INDEX: 23.99 KG/M2 | DIASTOLIC BLOOD PRESSURE: 72 MMHG

## 2018-10-03 VITALS
RESPIRATION RATE: 6 BRPM | SYSTOLIC BLOOD PRESSURE: 111 MMHG | DIASTOLIC BLOOD PRESSURE: 66 MMHG | OXYGEN SATURATION: 100 %

## 2018-10-03 PROCEDURE — 7100000011 HC PHASE II RECOVERY - ADDTL 15 MIN: Performed by: ORAL & MAXILLOFACIAL SURGERY

## 2018-10-03 PROCEDURE — 2709999900 HC NON-CHARGEABLE SUPPLY: Performed by: ORAL & MAXILLOFACIAL SURGERY

## 2018-10-03 PROCEDURE — 6370000000 HC RX 637 (ALT 250 FOR IP): Performed by: ORAL & MAXILLOFACIAL SURGERY

## 2018-10-03 PROCEDURE — 7100000000 HC PACU RECOVERY - FIRST 15 MIN: Performed by: ORAL & MAXILLOFACIAL SURGERY

## 2018-10-03 PROCEDURE — 7100000010 HC PHASE II RECOVERY - FIRST 15 MIN: Performed by: ORAL & MAXILLOFACIAL SURGERY

## 2018-10-03 PROCEDURE — 2500000003 HC RX 250 WO HCPCS: Performed by: ORAL & MAXILLOFACIAL SURGERY

## 2018-10-03 PROCEDURE — 3600000004 HC SURGERY LEVEL 4 BASE: Performed by: ORAL & MAXILLOFACIAL SURGERY

## 2018-10-03 PROCEDURE — 3700000000 HC ANESTHESIA ATTENDED CARE: Performed by: ORAL & MAXILLOFACIAL SURGERY

## 2018-10-03 PROCEDURE — 3700000001 HC ADD 15 MINUTES (ANESTHESIA): Performed by: ORAL & MAXILLOFACIAL SURGERY

## 2018-10-03 PROCEDURE — 7100000001 HC PACU RECOVERY - ADDTL 15 MIN: Performed by: ORAL & MAXILLOFACIAL SURGERY

## 2018-10-03 PROCEDURE — 2500000003 HC RX 250 WO HCPCS: Performed by: NURSE ANESTHETIST, CERTIFIED REGISTERED

## 2018-10-03 PROCEDURE — 2580000003 HC RX 258: Performed by: ANESTHESIOLOGY

## 2018-10-03 PROCEDURE — 93005 ELECTROCARDIOGRAM TRACING: CPT

## 2018-10-03 PROCEDURE — 3600000014 HC SURGERY LEVEL 4 ADDTL 15MIN: Performed by: ORAL & MAXILLOFACIAL SURGERY

## 2018-10-03 PROCEDURE — 6360000002 HC RX W HCPCS: Performed by: NURSE ANESTHETIST, CERTIFIED REGISTERED

## 2018-10-03 PROCEDURE — 2580000003 HC RX 258: Performed by: ORAL & MAXILLOFACIAL SURGERY

## 2018-10-03 RX ORDER — METHYLPREDNISOLONE SODIUM SUCCINATE 125 MG/2ML
INJECTION, POWDER, LYOPHILIZED, FOR SOLUTION INTRAMUSCULAR; INTRAVENOUS PRN
Status: DISCONTINUED | OUTPATIENT
Start: 2018-10-03 | End: 2018-10-03 | Stop reason: SDUPTHER

## 2018-10-03 RX ORDER — MORPHINE SULFATE 2 MG/ML
2 INJECTION, SOLUTION INTRAMUSCULAR; INTRAVENOUS EVERY 5 MIN PRN
Status: DISCONTINUED | OUTPATIENT
Start: 2018-10-03 | End: 2018-10-03 | Stop reason: HOSPADM

## 2018-10-03 RX ORDER — LIDOCAINE HYDROCHLORIDE 20 MG/ML
INJECTION, SOLUTION INFILTRATION; PERINEURAL PRN
Status: DISCONTINUED | OUTPATIENT
Start: 2018-10-03 | End: 2018-10-03 | Stop reason: SDUPTHER

## 2018-10-03 RX ORDER — MORPHINE SULFATE 2 MG/ML
1 INJECTION, SOLUTION INTRAMUSCULAR; INTRAVENOUS EVERY 5 MIN PRN
Status: DISCONTINUED | OUTPATIENT
Start: 2018-10-03 | End: 2018-10-03 | Stop reason: HOSPADM

## 2018-10-03 RX ORDER — OXYMETAZOLINE HYDROCHLORIDE 0.05 G/100ML
2 SPRAY NASAL ONCE
Status: COMPLETED | OUTPATIENT
Start: 2018-10-03 | End: 2018-10-03

## 2018-10-03 RX ORDER — FENTANYL CITRATE 50 UG/ML
INJECTION, SOLUTION INTRAMUSCULAR; INTRAVENOUS PRN
Status: DISCONTINUED | OUTPATIENT
Start: 2018-10-03 | End: 2018-10-03 | Stop reason: SDUPTHER

## 2018-10-03 RX ORDER — MIDAZOLAM HYDROCHLORIDE 1 MG/ML
INJECTION INTRAMUSCULAR; INTRAVENOUS PRN
Status: DISCONTINUED | OUTPATIENT
Start: 2018-10-03 | End: 2018-10-03 | Stop reason: SDUPTHER

## 2018-10-03 RX ORDER — BUPIVACAINE HYDROCHLORIDE AND EPINEPHRINE 2.5; 5 MG/ML; UG/ML
INJECTION, SOLUTION EPIDURAL; INFILTRATION; INTRACAUDAL; PERINEURAL PRN
Status: DISCONTINUED | OUTPATIENT
Start: 2018-10-03 | End: 2018-10-03 | Stop reason: HOSPADM

## 2018-10-03 RX ORDER — SODIUM CHLORIDE, SODIUM LACTATE, POTASSIUM CHLORIDE, CALCIUM CHLORIDE 600; 310; 30; 20 MG/100ML; MG/100ML; MG/100ML; MG/100ML
INJECTION, SOLUTION INTRAVENOUS CONTINUOUS
Status: DISCONTINUED | OUTPATIENT
Start: 2018-10-03 | End: 2018-10-03 | Stop reason: HOSPADM

## 2018-10-03 RX ORDER — HYDRALAZINE HYDROCHLORIDE 20 MG/ML
5 INJECTION INTRAMUSCULAR; INTRAVENOUS EVERY 10 MIN PRN
Status: DISCONTINUED | OUTPATIENT
Start: 2018-10-03 | End: 2018-10-03 | Stop reason: HOSPADM

## 2018-10-03 RX ORDER — ONDANSETRON 2 MG/ML
INJECTION INTRAMUSCULAR; INTRAVENOUS PRN
Status: DISCONTINUED | OUTPATIENT
Start: 2018-10-03 | End: 2018-10-03 | Stop reason: SDUPTHER

## 2018-10-03 RX ORDER — MEPERIDINE HYDROCHLORIDE 50 MG/ML
12.5 INJECTION INTRAMUSCULAR; INTRAVENOUS; SUBCUTANEOUS EVERY 5 MIN PRN
Status: DISCONTINUED | OUTPATIENT
Start: 2018-10-03 | End: 2018-10-03 | Stop reason: HOSPADM

## 2018-10-03 RX ORDER — OXYCODONE HYDROCHLORIDE AND ACETAMINOPHEN 5; 325 MG/1; MG/1
2 TABLET ORAL PRN
Status: DISCONTINUED | OUTPATIENT
Start: 2018-10-03 | End: 2018-10-03 | Stop reason: HOSPADM

## 2018-10-03 RX ORDER — ROCURONIUM BROMIDE 10 MG/ML
INJECTION, SOLUTION INTRAVENOUS PRN
Status: DISCONTINUED | OUTPATIENT
Start: 2018-10-03 | End: 2018-10-03 | Stop reason: SDUPTHER

## 2018-10-03 RX ORDER — OXYMETAZOLINE HYDROCHLORIDE 0.05 G/100ML
SPRAY NASAL
Status: DISCONTINUED
Start: 2018-10-03 | End: 2018-10-03 | Stop reason: HOSPADM

## 2018-10-03 RX ORDER — SODIUM CHLORIDE 0.9 % (FLUSH) 0.9 %
10 SYRINGE (ML) INJECTION EVERY 12 HOURS SCHEDULED
Status: DISCONTINUED | OUTPATIENT
Start: 2018-10-03 | End: 2018-10-03 | Stop reason: HOSPADM

## 2018-10-03 RX ORDER — PROPOFOL 10 MG/ML
INJECTION, EMULSION INTRAVENOUS PRN
Status: DISCONTINUED | OUTPATIENT
Start: 2018-10-03 | End: 2018-10-03 | Stop reason: SDUPTHER

## 2018-10-03 RX ORDER — DIPHENHYDRAMINE HYDROCHLORIDE 50 MG/ML
12.5 INJECTION INTRAMUSCULAR; INTRAVENOUS
Status: DISCONTINUED | OUTPATIENT
Start: 2018-10-03 | End: 2018-10-03 | Stop reason: HOSPADM

## 2018-10-03 RX ORDER — LABETALOL HYDROCHLORIDE 5 MG/ML
5 INJECTION, SOLUTION INTRAVENOUS EVERY 10 MIN PRN
Status: DISCONTINUED | OUTPATIENT
Start: 2018-10-03 | End: 2018-10-03 | Stop reason: HOSPADM

## 2018-10-03 RX ORDER — MAGNESIUM HYDROXIDE 1200 MG/15ML
LIQUID ORAL CONTINUOUS PRN
Status: DISCONTINUED | OUTPATIENT
Start: 2018-10-03 | End: 2018-10-03 | Stop reason: HOSPADM

## 2018-10-03 RX ORDER — ONDANSETRON 2 MG/ML
4 INJECTION INTRAMUSCULAR; INTRAVENOUS PRN
Status: DISCONTINUED | OUTPATIENT
Start: 2018-10-03 | End: 2018-10-03 | Stop reason: HOSPADM

## 2018-10-03 RX ORDER — PROMETHAZINE HYDROCHLORIDE 25 MG/ML
6.25 INJECTION, SOLUTION INTRAMUSCULAR; INTRAVENOUS
Status: DISCONTINUED | OUTPATIENT
Start: 2018-10-03 | End: 2018-10-03 | Stop reason: HOSPADM

## 2018-10-03 RX ORDER — SODIUM CHLORIDE 0.9 % (FLUSH) 0.9 %
10 SYRINGE (ML) INJECTION PRN
Status: DISCONTINUED | OUTPATIENT
Start: 2018-10-03 | End: 2018-10-03 | Stop reason: HOSPADM

## 2018-10-03 RX ORDER — OXYCODONE HYDROCHLORIDE AND ACETAMINOPHEN 5; 325 MG/1; MG/1
1 TABLET ORAL PRN
Status: DISCONTINUED | OUTPATIENT
Start: 2018-10-03 | End: 2018-10-03 | Stop reason: HOSPADM

## 2018-10-03 RX ORDER — LIDOCAINE HYDROCHLORIDE 10 MG/ML
0.3 INJECTION, SOLUTION EPIDURAL; INFILTRATION; INTRACAUDAL; PERINEURAL
Status: DISCONTINUED | OUTPATIENT
Start: 2018-10-03 | End: 2018-10-03 | Stop reason: HOSPADM

## 2018-10-03 RX ADMIN — MIDAZOLAM HYDROCHLORIDE 2 MG: 2 INJECTION, SOLUTION INTRAMUSCULAR; INTRAVENOUS at 09:20

## 2018-10-03 RX ADMIN — ROCURONIUM BROMIDE 50 MG: 10 SOLUTION INTRAVENOUS at 09:32

## 2018-10-03 RX ADMIN — PROPOFOL 200 MG: 10 INJECTION, EMULSION INTRAVENOUS at 09:32

## 2018-10-03 RX ADMIN — OXYMETAZOLINE HYDROCHLORIDE 2 SPRAY: 5 SPRAY NASAL at 09:01

## 2018-10-03 RX ADMIN — METHYLPREDNISOLONE SODIUM SUCCINATE 125 MG: 125 INJECTION, POWDER, FOR SOLUTION INTRAMUSCULAR; INTRAVENOUS at 09:40

## 2018-10-03 RX ADMIN — FENTANYL CITRATE 150 MCG: 50 INJECTION, SOLUTION INTRAMUSCULAR; INTRAVENOUS at 09:32

## 2018-10-03 RX ADMIN — SODIUM CHLORIDE, POTASSIUM CHLORIDE, SODIUM LACTATE AND CALCIUM CHLORIDE: 600; 310; 30; 20 INJECTION, SOLUTION INTRAVENOUS at 09:01

## 2018-10-03 RX ADMIN — SUGAMMADEX 100 MG: 100 INJECTION, SOLUTION INTRAVENOUS at 10:43

## 2018-10-03 RX ADMIN — ONDANSETRON 4 MG: 2 INJECTION INTRAMUSCULAR; INTRAVENOUS at 09:57

## 2018-10-03 RX ADMIN — LIDOCAINE HYDROCHLORIDE 40 MG: 20 INJECTION, SOLUTION INFILTRATION; PERINEURAL at 09:32

## 2018-10-03 ASSESSMENT — PULMONARY FUNCTION TESTS
PIF_VALUE: 20
PIF_VALUE: 16
PIF_VALUE: 20
PIF_VALUE: 18
PIF_VALUE: 18
PIF_VALUE: 6
PIF_VALUE: 16
PIF_VALUE: 20
PIF_VALUE: 12
PIF_VALUE: 19
PIF_VALUE: 16
PIF_VALUE: 19
PIF_VALUE: 20
PIF_VALUE: 1
PIF_VALUE: 20
PIF_VALUE: 16
PIF_VALUE: 14
PIF_VALUE: 20
PIF_VALUE: 2
PIF_VALUE: 20
PIF_VALUE: 19
PIF_VALUE: 16
PIF_VALUE: 18
PIF_VALUE: 1
PIF_VALUE: 19
PIF_VALUE: 3
PIF_VALUE: 18
PIF_VALUE: 20
PIF_VALUE: 20
PIF_VALUE: 18
PIF_VALUE: 20
PIF_VALUE: 1
PIF_VALUE: 20
PIF_VALUE: 18
PIF_VALUE: 19
PIF_VALUE: 20
PIF_VALUE: 24
PIF_VALUE: 20
PIF_VALUE: 2
PIF_VALUE: 20
PIF_VALUE: 16
PIF_VALUE: 19
PIF_VALUE: 19
PIF_VALUE: 21
PIF_VALUE: 20
PIF_VALUE: 21
PIF_VALUE: 20
PIF_VALUE: 20
PIF_VALUE: 19
PIF_VALUE: 19
PIF_VALUE: 22
PIF_VALUE: 20
PIF_VALUE: 16
PIF_VALUE: 16
PIF_VALUE: 20
PIF_VALUE: 4
PIF_VALUE: 18
PIF_VALUE: 3
PIF_VALUE: 2
PIF_VALUE: 16
PIF_VALUE: 18
PIF_VALUE: 16
PIF_VALUE: 12
PIF_VALUE: 11
PIF_VALUE: 20
PIF_VALUE: 19
PIF_VALUE: 3
PIF_VALUE: 21
PIF_VALUE: 20
PIF_VALUE: 19
PIF_VALUE: 18
PIF_VALUE: 20

## 2018-10-03 ASSESSMENT — PAIN SCALES - GENERAL: PAINLEVEL_OUTOF10: 0

## 2018-10-03 NOTE — ADDENDUM NOTE
Addendum  created 10/03/18 1159 by LADARIUS Rodriges - CRNA    Anesthesia Intra Meds edited, Orders acknowledged in Narrator

## 2018-10-03 NOTE — ANESTHESIA POSTPROCEDURE EVALUATION
Department of Anesthesiology  Postprocedure Note    Patient: Karina Wise  MRN: [id number]  YOB: 1955  Date of evaluation: 10/3/2018  Time:  11:00 AM     Procedure Summary     Date:  10/03/18 [facility]  [facility]    Anesthesia Start:  8866 Anesthesia Stop:  4923    Procedure:  DENTAL EXTRACTION # 3 WITH ALVEOLAR RIDGE PRESERVATION  (N/A Mouth) Diagnosis:  (DENTAL EXTRACTION)    Surgeon:  Juanito Olmstead MD Responsible Provider:  Sheela Tuttle MD    Anesthesia Type:  general ASA Status:  3          Anesthesia Type: general    Jane Phase I: Jane Score: 8    Jane Phase II:      Last vitals: Reviewed and per EMR flowsheets.        Anesthesia Post Evaluation    Patient location during evaluation: PACU  Patient participation: complete - patient participated  Level of consciousness: awake and alert  Pain score: 0  Airway patency: patent  Nausea & Vomiting: no nausea and no vomiting  Complications: no  Cardiovascular status: blood pressure returned to baseline  Respiratory status: acceptable  Hydration status: stable

## 2018-10-03 NOTE — ANESTHESIA PRE PROCEDURE
110/80   11/06/17 114/64       NPO Status:                                                                                 BMI:   Wt Readings from Last 3 Encounters:   09/28/18 197 lb (89.4 kg)   09/13/18 197 lb 9.6 oz (89.6 kg)   09/04/18 198 lb 9.6 oz (90.1 kg)     Body mass index is 23.98 kg/m². CBC:   Lab Results   Component Value Date    WBC 8.7 07/11/2017    RBC 4.09 07/11/2017    HGB 12.0 07/11/2017    HCT 36.0 07/11/2017    MCV 87.8 07/11/2017    RDW 13.9 07/11/2017     07/11/2017       CMP:   Lab Results   Component Value Date     07/11/2017    K 4.1 07/11/2017    CL 99 07/11/2017    CO2 27 07/11/2017    BUN 10 07/11/2017    CREATININE 0.7 07/11/2017    GFRAA >60 07/11/2017    AGRATIO 1.3 07/11/2017    LABGLOM >60 07/11/2017    GLUCOSE 106 07/11/2017    PROT 7.2 07/11/2017    CALCIUM 9.2 07/11/2017    BILITOT 1.2 07/11/2017    ALKPHOS 72 07/11/2017    AST 28 07/11/2017    ALT 18 07/11/2017       POC Tests: No results for input(s): POCGLU, POCNA, POCK, POCCL, POCBUN, POCHEMO, POCHCT in the last 72 hours. Coags:   Lab Results   Component Value Date    INR 3.3 11/04/2013       HCG (If Applicable): No results found for: PREGTESTUR, PREGSERUM, HCG, HCGQUANT     ABGs: No results found for: PHART, PO2ART, PXC7HDP, UHF2OTW, BEART, V3FMQSCQ     Type & Screen (If Applicable):  No results found for: [facility]    Anesthesia Evaluation  Patient summary reviewed and Nursing notes reviewed   history of anesthetic complications: PONV.   Airway: Mallampati: I  TM distance: >3 FB   Neck ROM: full  Mouth opening: > = 3 FB Dental: normal exam         Pulmonary:Negative Pulmonary ROS and normal exam  breath sounds clear to auscultation                             Cardiovascular:    (+) pacemaker: pacemaker, dysrhythmias: atrial fibrillation,         Rhythm: regular  Rate: normal                    Neuro/Psych:   Negative Neuro/Psych ROS              GI/Hepatic/Renal:   (+) GERD: well controlled,

## 2018-10-04 LAB
EKG ATRIAL RATE: 35 BPM
EKG DIAGNOSIS: NORMAL
EKG Q-T INTERVAL: 434 MS
EKG QRS DURATION: 106 MS
EKG QTC CALCULATION (BAZETT): 434 MS
EKG R AXIS: -34 DEGREES
EKG T AXIS: 241 DEGREES
EKG VENTRICULAR RATE: 60 BPM

## 2018-10-08 ENCOUNTER — OFFICE VISIT (OUTPATIENT)
Dept: INTERNAL MEDICINE CLINIC | Age: 63
End: 2018-10-08
Payer: MEDICARE

## 2018-10-08 VITALS
WEIGHT: 201 LBS | HEIGHT: 76 IN | OXYGEN SATURATION: 98 % | HEART RATE: 78 BPM | DIASTOLIC BLOOD PRESSURE: 74 MMHG | BODY MASS INDEX: 24.48 KG/M2 | SYSTOLIC BLOOD PRESSURE: 116 MMHG | TEMPERATURE: 98.3 F

## 2018-10-08 DIAGNOSIS — J98.01 COUGH DUE TO BRONCHOSPASM: Primary | ICD-10-CM

## 2018-10-08 PROCEDURE — 99213 OFFICE O/P EST LOW 20 MIN: CPT | Performed by: INTERNAL MEDICINE

## 2018-10-08 PROCEDURE — G8427 DOCREV CUR MEDS BY ELIG CLIN: HCPCS | Performed by: INTERNAL MEDICINE

## 2018-10-08 PROCEDURE — G8420 CALC BMI NORM PARAMETERS: HCPCS | Performed by: INTERNAL MEDICINE

## 2018-10-08 PROCEDURE — G8484 FLU IMMUNIZE NO ADMIN: HCPCS | Performed by: INTERNAL MEDICINE

## 2018-10-08 PROCEDURE — 1123F ACP DISCUSS/DSCN MKR DOCD: CPT | Performed by: INTERNAL MEDICINE

## 2018-10-08 PROCEDURE — 4040F PNEUMOC VAC/ADMIN/RCVD: CPT | Performed by: INTERNAL MEDICINE

## 2018-10-08 PROCEDURE — 3017F COLORECTAL CA SCREEN DOC REV: CPT | Performed by: INTERNAL MEDICINE

## 2018-10-08 PROCEDURE — 1101F PT FALLS ASSESS-DOCD LE1/YR: CPT | Performed by: INTERNAL MEDICINE

## 2018-10-08 PROCEDURE — 1036F TOBACCO NON-USER: CPT | Performed by: INTERNAL MEDICINE

## 2018-11-06 ENCOUNTER — NURSE ONLY (OUTPATIENT)
Dept: INTERNAL MEDICINE CLINIC | Age: 63
End: 2018-11-06
Payer: MEDICARE

## 2018-11-06 DIAGNOSIS — Z23 NEED FOR STREPTOCOCCUS PNEUMONIAE VACCINATION: ICD-10-CM

## 2018-11-06 DIAGNOSIS — Z23 NEED FOR INFLUENZA VACCINATION: Primary | ICD-10-CM

## 2018-11-06 PROCEDURE — 90662 IIV NO PRSV INCREASED AG IM: CPT | Performed by: INTERNAL MEDICINE

## 2018-11-06 PROCEDURE — G0009 ADMIN PNEUMOCOCCAL VACCINE: HCPCS | Performed by: INTERNAL MEDICINE

## 2018-11-06 PROCEDURE — G0008 ADMIN INFLUENZA VIRUS VAC: HCPCS | Performed by: INTERNAL MEDICINE

## 2018-11-06 PROCEDURE — 90670 PCV13 VACCINE IM: CPT | Performed by: INTERNAL MEDICINE

## 2019-10-03 ENCOUNTER — OFFICE VISIT (OUTPATIENT)
Dept: INTERNAL MEDICINE CLINIC | Age: 64
End: 2019-10-03
Payer: MEDICARE

## 2019-10-03 VITALS
DIASTOLIC BLOOD PRESSURE: 74 MMHG | OXYGEN SATURATION: 98 % | HEIGHT: 76 IN | HEART RATE: 68 BPM | SYSTOLIC BLOOD PRESSURE: 120 MMHG | BODY MASS INDEX: 24.48 KG/M2 | WEIGHT: 201 LBS

## 2019-10-03 DIAGNOSIS — L70.8 OTHER ACNE: ICD-10-CM

## 2019-10-03 DIAGNOSIS — Z00.00 ROUTINE GENERAL MEDICAL EXAMINATION AT A HEALTH CARE FACILITY: Primary | ICD-10-CM

## 2019-10-03 DIAGNOSIS — Z23 NEED FOR INFLUENZA VACCINATION: ICD-10-CM

## 2019-10-03 DIAGNOSIS — F51.02 ADJUSTMENT INSOMNIA: ICD-10-CM

## 2019-10-03 DIAGNOSIS — D22.9 ATYPICAL MOLE: ICD-10-CM

## 2019-10-03 DIAGNOSIS — Z71.84 TRAVEL ADVICE ENCOUNTER: ICD-10-CM

## 2019-10-03 PROCEDURE — G0008 ADMIN INFLUENZA VIRUS VAC: HCPCS | Performed by: INTERNAL MEDICINE

## 2019-10-03 PROCEDURE — 90653 IIV ADJUVANT VACCINE IM: CPT | Performed by: INTERNAL MEDICINE

## 2019-10-03 PROCEDURE — 4040F PNEUMOC VAC/ADMIN/RCVD: CPT | Performed by: INTERNAL MEDICINE

## 2019-10-03 PROCEDURE — 1123F ACP DISCUSS/DSCN MKR DOCD: CPT | Performed by: INTERNAL MEDICINE

## 2019-10-03 PROCEDURE — 3017F COLORECTAL CA SCREEN DOC REV: CPT | Performed by: INTERNAL MEDICINE

## 2019-10-03 PROCEDURE — G8420 CALC BMI NORM PARAMETERS: HCPCS | Performed by: INTERNAL MEDICINE

## 2019-10-03 PROCEDURE — G8482 FLU IMMUNIZE ORDER/ADMIN: HCPCS | Performed by: INTERNAL MEDICINE

## 2019-10-03 PROCEDURE — 1036F TOBACCO NON-USER: CPT | Performed by: INTERNAL MEDICINE

## 2019-10-03 PROCEDURE — G8427 DOCREV CUR MEDS BY ELIG CLIN: HCPCS | Performed by: INTERNAL MEDICINE

## 2019-10-03 PROCEDURE — 99214 OFFICE O/P EST MOD 30 MIN: CPT | Performed by: INTERNAL MEDICINE

## 2019-10-03 PROCEDURE — G0439 PPPS, SUBSEQ VISIT: HCPCS | Performed by: INTERNAL MEDICINE

## 2019-10-03 RX ORDER — CIPROFLOXACIN 500 MG/1
500 TABLET, FILM COATED ORAL 2 TIMES DAILY
Qty: 20 TABLET | Refills: 0 | Status: SHIPPED | OUTPATIENT
Start: 2019-10-03 | End: 2019-10-13

## 2019-10-03 RX ORDER — ZOLPIDEM TARTRATE 5 MG/1
5 TABLET ORAL NIGHTLY PRN
Qty: 30 TABLET | Refills: 0 | Status: SHIPPED | OUTPATIENT
Start: 2019-10-03 | End: 2020-02-19 | Stop reason: SDUPTHER

## 2019-10-03 ASSESSMENT — LIFESTYLE VARIABLES
HOW OFTEN DO YOU HAVE SIX OR MORE DRINKS ON ONE OCCASION: 0
HOW OFTEN DURING THE LAST YEAR HAVE YOU FOUND THAT YOU WERE NOT ABLE TO STOP DRINKING ONCE YOU HAD STARTED: 0
HOW MANY STANDARD DRINKS CONTAINING ALCOHOL DO YOU HAVE ON A TYPICAL DAY: 0
HOW OFTEN DURING THE LAST YEAR HAVE YOU FAILED TO DO WHAT WAS NORMALLY EXPECTED FROM YOU BECAUSE OF DRINKING: 0
HOW OFTEN DURING THE LAST YEAR HAVE YOU HAD A FEELING OF GUILT OR REMORSE AFTER DRINKING: 0
HAVE YOU OR SOMEONE ELSE BEEN INJURED AS A RESULT OF YOUR DRINKING: 0
HOW OFTEN DURING THE LAST YEAR HAVE YOU NEEDED AN ALCOHOLIC DRINK FIRST THING IN THE MORNING TO GET YOURSELF GOING AFTER A NIGHT OF HEAVY DRINKING: 0
AUDIT-C TOTAL SCORE: 1
HOW OFTEN DURING THE LAST YEAR HAVE YOU BEEN UNABLE TO REMEMBER WHAT HAPPENED THE NIGHT BEFORE BECAUSE YOU HAD BEEN DRINKING: 0
AUDIT TOTAL SCORE: 1
HAS A RELATIVE, FRIEND, DOCTOR, OR ANOTHER HEALTH PROFESSIONAL EXPRESSED CONCERN ABOUT YOUR DRINKING OR SUGGESTED YOU CUT DOWN: 0
HOW OFTEN DO YOU HAVE A DRINK CONTAINING ALCOHOL: 1

## 2019-10-03 ASSESSMENT — PATIENT HEALTH QUESTIONNAIRE - PHQ9
SUM OF ALL RESPONSES TO PHQ QUESTIONS 1-9: 0
SUM OF ALL RESPONSES TO PHQ QUESTIONS 1-9: 0

## 2019-11-26 ENCOUNTER — OFFICE VISIT (OUTPATIENT)
Dept: DERMATOLOGY | Age: 64
End: 2019-11-26
Payer: MEDICARE

## 2019-11-26 DIAGNOSIS — D48.5 NEOPLASM OF UNCERTAIN BEHAVIOR OF SKIN: Primary | ICD-10-CM

## 2019-11-26 DIAGNOSIS — Z12.83 SKIN CANCER SCREENING: ICD-10-CM

## 2019-11-26 PROCEDURE — 3017F COLORECTAL CA SCREEN DOC REV: CPT | Performed by: DERMATOLOGY

## 2019-11-26 PROCEDURE — G8420 CALC BMI NORM PARAMETERS: HCPCS | Performed by: DERMATOLOGY

## 2019-11-26 PROCEDURE — 99203 OFFICE O/P NEW LOW 30 MIN: CPT | Performed by: DERMATOLOGY

## 2019-11-26 PROCEDURE — G8482 FLU IMMUNIZE ORDER/ADMIN: HCPCS | Performed by: DERMATOLOGY

## 2019-11-26 PROCEDURE — 1036F TOBACCO NON-USER: CPT | Performed by: DERMATOLOGY

## 2019-11-26 PROCEDURE — 1123F ACP DISCUSS/DSCN MKR DOCD: CPT | Performed by: DERMATOLOGY

## 2019-11-26 PROCEDURE — 4040F PNEUMOC VAC/ADMIN/RCVD: CPT | Performed by: DERMATOLOGY

## 2019-11-26 PROCEDURE — G8427 DOCREV CUR MEDS BY ELIG CLIN: HCPCS | Performed by: DERMATOLOGY

## 2019-12-26 PROBLEM — Z12.83 SKIN CANCER SCREENING: Status: RESOLVED | Noted: 2019-11-26 | Resolved: 2019-12-26

## 2020-01-14 ENCOUNTER — OFFICE VISIT (OUTPATIENT)
Dept: DERMATOLOGY | Age: 65
End: 2020-01-14
Payer: MEDICARE

## 2020-01-14 PROCEDURE — 11102 TANGNTL BX SKIN SINGLE LES: CPT | Performed by: DERMATOLOGY

## 2020-01-14 PROCEDURE — 69100 BIOPSY OF EXTERNAL EAR: CPT | Performed by: DERMATOLOGY

## 2020-01-14 NOTE — PATIENT INSTRUCTIONS
Biopsy Wound Care Instructions     Keep the bandage in place for 24 hours.  Cleanse the wound with mild soapy water daily   Gently dry the area.  Apply Vaseline or petroleum jelly to the wound using a cotton tipped applicator.  Cover with a clean bandage.  Repeat this process until the biopsy site is healed.  If you had stitches placed, continue treating the site until the stitches are removed. Remember to make an appointment to return to have your stitches removed by our staff.  You may shower and bathe as usual.     ** Biopsy results generally take around 7 business days to come back. If you have not heard from us by then, please call the office at [phone] between 8AM and 4PM Monday through Friday.

## 2020-01-14 NOTE — PROGRESS NOTES
[address] [facility], [place] [facility] ([facility]) Physicians    Previous clinic visit: Nov 2019    CC: biopsies     HPI:    1.) Here today for biopsies of lesion L ear and R superior forehead. Has a several year duration of an enlarging and darkening lesion located on L ear. No tx to date. Denies sx. Also has several mo h/o tender scaling spot on R upper forehead. No tx to date. Denies h/o skin cancers previously. Not always great about sun exposure; is an avid golfer. Has home in [place]. DERM HISTORY:   Personal history of NMSC or MM- no    Family history of NMSC or MM- no   Sunburns easily- No   Uses sunscreen-sometimes  History of tanning bed use- No    ADDITIONAL HISTORY:    I have reviewed past medical and surgical histories, current medications, allergies, social and family histories as documented in the patient's electronic medical record. Current Outpatient Medications   Medication Sig Dispense Refill    zolpidem (AMBIEN) 5 MG tablet Take 1 tablet by mouth nightly as needed for Sleep for up to 350 days. 30 tablet 0    albuterol sulfate HFA (PROAIR HFA) 108 (90 Base) MCG/ACT inhaler Inhale 2 puffs into the lungs every 6 hours as needed for Wheezing 1 Inhaler 0    apixaban (ELIQUIS) 5 MG TABS tablet Take 5 mg by mouth 2 times daily      rosuvastatin (CRESTOR) 10 MG tablet Take 10 mg by mouth daily      irbesartan (AVAPRO) 150 MG tablet Take 75 mg by mouth daily Indications: Take 1/2 tablet daily       carvedilol (COREG CR) 40 MG CP24 Take 40 mg by mouth daily (with breakfast)       pantoprazole (PROTONIX) 40 MG tablet Take 40 mg by mouth daily.  digoxin (LANOXIN) 0.25 MG tablet Take 125 mcg by mouth daily        No current facility-administered medications for this visit.         ROS:    General: No fevers, chills, sweats, unexplained weight loss or weight gain, fatigue, malaise   Skin: Denies additional lesions    Heme: No history of bleeding diatheses    Allergy: Denies seasonal or environmental allergies or other medication allergies     PHYSICAL EXAM:    General: Well-appearing, NAD    Neurologic/psychiatric: Patient is AOx3; mood and affect are appropriate and congruent  Eyes: conjunctivae and eyelids without erythema, injection, or discharge   Integument: Examination was performed of the following and were unremarkable except as otherwise noted below:scalp, hair, face, ears, mucosa of gums/teeth/cutaneous and mucosal lips, buccal mucosa, oropharynx, neck, breast/axilla/chest, abdomen, groin, back, buttocks, RUE, LUE, RLE, LLE, digits/nails, apocrine/eccrine glands; genital exam deferred per pt request    Abnormalities noted include:              1.) L ear involving the antihelix with a 1.5 cm irregularly shaped dark brown patch with focus of nearly black pigment near conchal bowl.   2.)  R superior forehead with a few mm ill defined pink scaly pearly focally pigmented papule       ASSESSMENT AND PLAN:    1.)  Rule out lentigo maligna:   -Shave biopsy x 1     -Informed consent obtained    -Area marked, cleaned, anesthetized with 1% lidocaine with epinephrine     -Shave biopsy performed    -Hemostasis obtained with aluminum chloride    -Aquaphor ointment and bandage applied    -Specimen(s) sent to dermatopathology lab for analysis   -Edu: woundcare, bleeding, infection, scar     2.)  Rule out pigmented BCC vs pigmented AK;   -Shave biopsy x 1     -Informed consent obtained    -Area marked, cleaned, anesthetized with 1% lidocaine with epinephrine     -Shave biopsy performed    -Hemostasis obtained with aluminum chloride    -Aquaphor ointment and bandage applied    -Specimen(s) sent to dermatopathology lab for analysis   -Edu: woundcare, bleeding, infection, scar       Return to Clinic: pending results  Discussed plan with patient and/or primary caretaker. Patient to call clinic with any questions / concerns.    Reviewed side effects of treatment(s) and/or medication(s) with patient and/or primary

## 2020-01-20 ENCOUNTER — TELEPHONE (OUTPATIENT)
Dept: DERMATOLOGY | Age: 65
End: 2020-01-20

## 2020-01-20 NOTE — TELEPHONE ENCOUNTER
Charla Friedman in regards to message   Spoke with Radha  Informed Dr. Aaron Reardon will not be in office until tomorrow   Dr. Poncho Sheth understands  Will try to call tomorrow around 12:30

## 2020-01-22 NOTE — TELEPHONE ENCOUNTER
Spoke with Dr. Loulou Gomez today. The lesion on the forehead was pigmented BCC. The lesion from the ear is not ready to be resulted. Obtaining SOX stain for melanocytes. May need further biopsies. If that's the case, will try to coordinate with MICHAEL Lal to obtain these as rule out is still melanoma. Left a general voicemail for patient that we are still chewing on the ear case and will update as soon as I get further info from dermpath.

## 2020-01-23 NOTE — TELEPHONE ENCOUNTER
MD spoke with patient   Advised of biopsy results and findings   Patient aware we will contact when stain is complete for final result

## 2020-01-23 NOTE — TELEPHONE ENCOUNTER
Annamaria received 1/23/20 at 1:57 pm    Please call patient on his cell# [phone] with the biopsy results.

## 2020-01-27 LAB — DERMATOLOGY PATHOLOGY REPORT: ABNORMAL

## 2020-01-30 ENCOUNTER — TELEPHONE (OUTPATIENT)
Dept: DERMATOLOGY | Age: 65
End: 2020-01-30

## 2020-01-30 NOTE — TELEPHONE ENCOUNTER
Results from 01/14/2020(scanned into media):     1. Location: Right superior forehead      Diagnosis: Basal Cell Carcinoma, nodular,        pigmented       Plan: Mohs    2. Location: Left Antihelix of Ear      Diagnosis: Consistent with atypical                    intraepidermal melanocytic proliferation, not      excised. Plan: Scouting biopsies to r/o MM. Referral        to Dr. Angelic Sweeney, Mohs     Mohs referral submitted   LVM for patient to return my call to inform of plan.

## 2020-02-03 NOTE — TELEPHONE ENCOUNTER
Spoke with patient   He is aware of results and tx plan   Has scheduled with Lukasz   Nothing further at this time   Encounter closed

## 2020-02-19 ENCOUNTER — OFFICE VISIT (OUTPATIENT)
Dept: INTERNAL MEDICINE CLINIC | Age: 65
End: 2020-02-19
Payer: MEDICARE

## 2020-02-19 VITALS
HEART RATE: 79 BPM | SYSTOLIC BLOOD PRESSURE: 122 MMHG | OXYGEN SATURATION: 98 % | BODY MASS INDEX: 24.36 KG/M2 | DIASTOLIC BLOOD PRESSURE: 60 MMHG | HEIGHT: 76 IN | WEIGHT: 200 LBS

## 2020-02-19 PROCEDURE — G8482 FLU IMMUNIZE ORDER/ADMIN: HCPCS | Performed by: INTERNAL MEDICINE

## 2020-02-19 PROCEDURE — G8427 DOCREV CUR MEDS BY ELIG CLIN: HCPCS | Performed by: INTERNAL MEDICINE

## 2020-02-19 PROCEDURE — 99214 OFFICE O/P EST MOD 30 MIN: CPT | Performed by: INTERNAL MEDICINE

## 2020-02-19 PROCEDURE — G8420 CALC BMI NORM PARAMETERS: HCPCS | Performed by: INTERNAL MEDICINE

## 2020-02-19 RX ORDER — ZOLPIDEM TARTRATE 5 MG/1
5 TABLET ORAL NIGHTLY PRN
Qty: 30 TABLET | Refills: 0 | Status: SHIPPED | OUTPATIENT
Start: 2020-02-19 | End: 2021-02-03

## 2020-02-19 NOTE — PROGRESS NOTES
PREOPERATIVE CONSULTATION      Robbin Stevenson  YOB: 1955    Date of Service:  2/19/2020    Vitals:    02/19/20 1101   BP: 122/60   Pulse: 79   SpO2: 98%   Weight: 200 lb (90.7 kg)   Height: 6' 4\" (1.93 m)      Wt Readings from Last 2 Encounters:   02/19/20 200 lb (90.7 kg)   10/03/19 201 lb (91.2 kg)     BP Readings from Last 3 Encounters:   02/19/20 122/60   10/03/19 120/74   10/08/18 116/74        Chief Complaint   Patient presents with    Pre-op Exam     03/03/2020- varicose vein surgery- Dr. Irineo Porras Other      Outpatient Medications Marked as Taking for the 2/19/20 encounter (Office Visit) with Sandra Beasley MD   Medication Sig Dispense Refill    zolpidem (AMBIEN) 5 MG tablet Take 1 tablet by mouth nightly as needed for Sleep for up to 350 days. 30 tablet 0    albuterol sulfate HFA (PROAIR HFA) 108 (90 Base) MCG/ACT inhaler Inhale 2 puffs into the lungs every 6 hours as needed for Wheezing 1 Inhaler 0    apixaban (ELIQUIS) 5 MG TABS tablet Take 5 mg by mouth 2 times daily      rosuvastatin (CRESTOR) 10 MG tablet Take 10 mg by mouth daily      irbesartan (AVAPRO) 150 MG tablet Take 75 mg by mouth daily Indications: Take 1/2 tablet daily       carvedilol (COREG CR) 40 MG CP24 Take 40 mg by mouth daily (with breakfast)       pantoprazole (PROTONIX) 40 MG tablet Take 40 mg by mouth daily.  digoxin (LANOXIN) 0.25 MG tablet Take 125 mcg by mouth daily          This patient presents to the office today for a preoperative consultation at the request of surgeon, Dr. Riya Simmons, who plans on performing LEFT LOWER EXTREMITY STAB PHLEBECTOMY; GREATER THAN 20 STABS; POSSIBLE SAPHENOFEMORAL LIGATION  on March 3 at [facility].  The current problem began many years ago, and symptoms have been worsening with time. Conservative therapy: Yes: injections, which has been not very effective. .    Planned anesthesia: General   Known anesthesia problems: None before surgery: HOLD ELIQUIS FOR 48 HRS PER CARDIOLOGIST  3. Prophylaxis for cardiac events with perioperative beta-blockers: Currently taking  carvedilol  ACC/AHA indications for pre-operative beta-blocker use:    · Vascular surgery with history of postitive stress test  · Intermediate or high risk surgery with history of CAD   · Intermediate or high risk surgery with multiple clinical predictors of CAD- 2 of the following: history of compensated or prior heart failure, history of cerebrovascular disease, DM, or renal insufficiency    Routine administration of higher-dose, long-acting metoprolol in beta-blocker-naïve patients on the day of surgery, and in the absence of dose titration is associated with an overall increase in mortality. Beta-blockers should be started days to weeks prior to surgery and titrated to pulse < 70.  4. Deep vein thrombosis prophylaxis: regimen to be chosen by surgical team  5. No contraindications to planned surgery if clear by cardiologist    Aspen Knight was seen today for pre-op exam.    Diagnoses and all orders for this visit:    Preop exam for internal medicine    Varicose veins of left lower extremity with pain    Chronic atrial fibrillation (HCC)    Nonischemic cardiomyopathy (HCC)    Adjustment insomnia  -     zolpidem (AMBIEN) 5 MG tablet; Take 1 tablet by mouth nightly as needed for Sleep for up to 350 days.               Sheri Pittman M.D.  [facility]  Office: [phone]  Fax: [phone]

## 2020-03-02 ENCOUNTER — TELEPHONE (OUTPATIENT)
Dept: INTERNAL MEDICINE CLINIC | Age: 65
End: 2020-03-02

## 2020-03-12 ENCOUNTER — PROCEDURE VISIT (OUTPATIENT)
Dept: SURGERY | Age: 65
End: 2020-03-12
Payer: MEDICARE

## 2020-03-12 VITALS
SYSTOLIC BLOOD PRESSURE: 116 MMHG | OXYGEN SATURATION: 98 % | HEART RATE: 62 BPM | WEIGHT: 203 LBS | DIASTOLIC BLOOD PRESSURE: 75 MMHG | BODY MASS INDEX: 24.71 KG/M2 | TEMPERATURE: 98.2 F

## 2020-03-12 PROBLEM — C44.319 BASAL CELL CARCINOMA OF FOREHEAD: Status: ACTIVE | Noted: 2020-03-12

## 2020-03-12 PROCEDURE — 11103 TANGNTL BX SKIN EA SEP/ADDL: CPT | Performed by: DERMATOLOGY

## 2020-03-12 PROCEDURE — 17311 MOHS 1 STAGE H/N/HF/G: CPT | Performed by: DERMATOLOGY

## 2020-03-12 PROCEDURE — 11102 TANGNTL BX SKIN SINGLE LES: CPT | Performed by: DERMATOLOGY

## 2020-03-12 PROCEDURE — 12032 INTMD RPR S/A/T/EXT 2.6-7.5: CPT | Performed by: DERMATOLOGY

## 2020-03-12 NOTE — PATIENT INSTRUCTIONS
[facility] Office Hours:    Monday-Thursday  7:30 AM-4:30 PM    Friday  9:00 AM-1:00 PM    Biopsy Wound Care Instructions   Cleanse the wound with mild soapy water daily.  Gently dry the area.  Apply Vaseline or petroleum jelly to the wound using a cotton tipped applicator.  Cover with a clean bandage.  Repeat this process until the biopsy site is healed.  If you had stitches placed, continue treating the site until the stitches are removed. Remember to make an appointment to return to have your stitches removed by our staff.  You may shower and bathe as usual.   ** Biopsy results generally take around 7 business days to come back. If you have not heard from us by then, please call the office at [phone] between [address] [place] [address] Monday through Friday. POST-OPERATIVE CARE FOR STICHES              Bandage change after 48 hours    CARING FOR YOUR SURGICAL SITE  The bandage should remain on and completley dry for 48 hours. Do NOT get the bandage wet. 1. After the first 48 hours, gently remove the remaining part of the bandage. It can be helpful to moisten the bandage edges in the shower. Steri strips may still be on the wound. It is ok, they will fall off slowly with the daily bandage changes. 2. Gently clean the wound daily with mild soap and water. Try to clean off crust and debris. 3. Dry (pat) the area with a clean Q-tip or gauze. 4. Apply a layer of Vaseline/ Aquaphor (or Bacitracin if your doctor recommends) to the wound area only. 5. Cut a piece of Telfa (or any non-stick dressing) to fit just over the wound and secure it with paper tape. If the wound is small you may use a Band- Aid. Keep area covered for a total of 1 week(s). If the dressing comes off or if you have questions, or concerns about the dressing, please call the office for instructions! POST OPERATIVE INSTRUCTIONS    1. Activity: Do not lift anything heavier than a gallon of milk for 1 week.

## 2020-03-12 NOTE — PROGRESS NOTES
MOHS PROCEDURE NOTE    PHYSICIAN:  Molly Thornton. Malu Cardenas MD    ASSISTANT: Harsh Jackman, RN     REFERRING PROVIDER:  Gina Smith MD    PREOPERATIVE DIAGNOSIS: Nodular Basal Cell Carcinoma     SPECIFIC MOHS INDICATIONS:  location    AUC SCORIN/9    POSTOPERATIVE DIAGNOSIS: SAME    LOCATION: Right superior forehead    OPERATIVE PROCEDURE:  MOHS MICROGRAPHIC SURGERY    RECONSTRUCTION OF DEFECT: Intermediate layered closure    PREOPERATIVE SIZE: 11x6 MM    DEFECT SIZE: 17x9 MM    LENGTH OF REPAIRED WOUND/SIZE OF FLAP/SIZE OF GRAFT:  36 MM    ANESTHESIA:  4.5mL 1% lidocaine with epinephrine 1:100,000 buffered. EBL:  MINIMAL    DURATION OF PROCEDURE:  1 HOUR    POSTOPERATIVE OBSERVATION: 30 MINUTES    SPECIMENS:  SEE MOHS MAP    COMPLICATIONS:  NONE    DESCRIPTION OF PROCEDURE:  The patient was given a mirror, as appropriate, and the biopsy site was identified, marked with a surgical marking pen, and verified by the patient. Options for treatment were discussed and the patient was informed that Mohs surgery was the selected treatment based on its lower recurrence rate, given the features listed above, as compared to other treatment modalities such as excision, radiation, or curettage, and agreed with this treatment plan. Risks and benefits including bruising, swelling, bleeding, infection, nerve injury, recurrence, and scarring were discussed with the patient prior to the procedure and a written consent detailing these and other risks was reviewed with the patient and signed. There was a time out for person and procedure verification. The surgical site was prepped with an antiseptic solution. Application of an antiseptic solution was repeated before each surgical stage. Stage I:  The clinically-apparent tumor was carefully defined and debulked, determining the edge of the surgical excision.     A thin layer of tumor-laden tissue was excised with a narrow margin of normal-appearing skin, using the

## 2020-03-13 ENCOUNTER — TELEPHONE (OUTPATIENT)
Dept: SURGERY | Age: 65
End: 2020-03-13

## 2020-03-13 NOTE — TELEPHONE ENCOUNTER
The patient was in the office on 3/12/2020 for mohs surgery located on the right superior forehead with ILC repair. The patient tolerated the procedure well and left the office in good condition. A post-operative telephone call was placed at 0940 in order to check on the patient's recovery process. The patient was not able to be reached and a phone message was left.

## 2020-03-23 ENCOUNTER — TELEPHONE (OUTPATIENT)
Dept: SURGERY | Age: 65
End: 2020-03-23

## 2020-03-23 NOTE — TELEPHONE ENCOUNTER
Pt notified. He stated his trip was cancelled and he is available to schedule. I let him know we would be calling him back to get him scheduled.

## 2020-03-26 ENCOUNTER — PROCEDURE VISIT (OUTPATIENT)
Dept: SURGERY | Age: 65
End: 2020-03-26

## 2020-03-26 PROCEDURE — 99024 POSTOP FOLLOW-UP VISIT: CPT | Performed by: DERMATOLOGY

## 2020-03-26 NOTE — Clinical Note
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  Please notate for future scheduling.

## 2020-03-30 NOTE — PROGRESS NOTES
S:  The patient is here for suture removal s/p Mohs surgery on the right upper forehead and Complex layered closure repair, 2 week(s) ago. The site appears well-healed without signs of infection (redness, pain or discharge). The sutures were removed. Looks great. No issues. Wound care and activity instructions given. The patient was scheduled for follow-up as needed for scar/wound check. The patient was scheduled for f/u with General Dermatology per their instructions. Pt also had scouting bx on left ear pigmented lesion which shows MMIS. D/w pt treatment necessary. Due to COVID 19 pandemic, [facility] recommending deferring treatment of all MMIS. Will reschedule patient in 1-3 months depending on pandemic resolution.

## 2020-04-13 ENCOUNTER — TELEPHONE (OUTPATIENT)
Dept: FAMILY MEDICINE CLINIC | Age: 65
End: 2020-04-13

## 2020-04-13 RX ORDER — VALACYCLOVIR HYDROCHLORIDE 1 G/1
TABLET, FILM COATED ORAL
Qty: 12 TABLET | Refills: 0 | Status: SHIPPED | OUTPATIENT
Start: 2020-04-13

## 2020-04-23 ENCOUNTER — TELEPHONE (OUTPATIENT)
Dept: SURGERY | Age: 65
End: 2020-04-23

## 2020-04-23 NOTE — TELEPHONE ENCOUNTER
As a reminder, you were referred to Dr. Sheree Bentley by (name of referring provider) Dr. Sheree Bentley for the treatment of a (diagnosis) c/w early melanoma in situ on your (body part) L lateral vashti. The biopsy was performed on (date of biopsy) 3/24/2020. I am reaching out to you again today, to discuss the temporary postponement of Your procedure in accordance with national and state recommendations and [facility]' incident command directives for resource conservation due to the [place] 19 pandemic. You will be rescheduled as soon as restrictions are eased. If your symptoms have changed since we last reached out to you or if you have any questions about your case, I can schedule a phone appointment for you with Dr. Sheree Bentley. In addition, We will contact you again in approximately 2 weeks with another update.     Was a phone appointment scheduled:  (no)

## 2020-05-18 ENCOUNTER — TELEPHONE (OUTPATIENT)
Dept: SURGERY | Age: 65
End: 2020-05-18

## 2020-05-18 NOTE — TELEPHONE ENCOUNTER
----- Message from Sherin Ochoa MD sent at 5/15/2020  8:27 PM EDT -----  Regarding: RE: [address] [facility]! We can try to do a virtual visit with him on Monday afternoon and then wait for the results for the Slow Mohs sample. I don't believe it should be a problem to get him on the schedule after final clear margins. Thanks!! Radames Riedel  ----- Message -----  From: Shruti Host  Sent: 5/15/2020  12:12 PM EDT  To: Vianey Odom MA, Flaca Greenberg MD, #  Subject: Ar Camarena REGARDING                                HI Team Phoebe Holman! Dr. Latasha Woodard wanted me to reach out to your office regarding Mr. Breen Bolds to schedule him for a consultation. Dr. Latasha Woodard will be arranging a Slow Mohs on this pt where for example we see him for first stage removal on a Monday and then wait for result sometime Tuesday to deterime if we need to take another stage on Wed or if he needs his closure on a Wed. Of course we would schedule him in association with Dr. Margaret Sarmiento surgery schedule. We can talk more about that. His diagnosis is below see lab tab for more details & see media tab for his photo of the L lateral vashti. I have not yet spoke with the patient yet so if you can give me a tentative consult date that would be helpful; Thank you,   Magalys Hess       Left lateral vashti-   Consistent with early melanoma in situ, not excised   In addition to basal layer hypermelanosis there are   scattered, slightly enlarged, hyperchromatic single cell   melanocytes in the basal layer and overlying epidermis that   also extend into the follicular epithelium and are   highlighted by a Sox-10 immunohistochemical stain. While   the single cell melanocytes are not as numerous as in the   prior biopsy, [id number], the changes are consistent   with early melanoma in situ. The lesion extends to a   peripheral margin. Multiple deeper cuts were done.    Immunohistochemistry was performed at [facility] and all   controls stained appropriately. Dr. Buck Weber also reviewed   the case and concurs with the diagnosis.

## 2020-05-18 NOTE — TELEPHONE ENCOUNTER
Reached out to patient based on MD note below. Patient would like to wait to consult with our team after results are final and patient has discussed with Dr Gaurav Camarillo. Patient states that he has a more serious cardiology concern going on and has many appointments in June. Routing to Dr Gaurav Camarillo and  Dr Martha Rios to make them aware.

## 2020-05-18 NOTE — TELEPHONE ENCOUNTER
I spoke with patient today and reminded him of our conversation on 3/30 during which I informed him of the pathology results on the left ear being melanoma in situ which would require a staged excision. We had deferred tx a that time due to Covid. Now that we are doing surgeries again, was hoping to coordinate my staged excision with reconstruction with Dr. Paul Garvey given the anticipated large size of the defect. Pt recollected after being reminded that we did review the final diagnostic pathology. Unfortunately, in the interim since 3/30, his defibrillator has developed an issue and he is needing to have this replaced. Anticipated this will be happening in early June. He wants to wait until that issue is resolved before proceeding with treating the MMIS on the left ear. We will reach back out to him end of June and he was instructed to call sooner if he is ready to proceed.

## 2020-07-16 ENCOUNTER — TELEPHONE (OUTPATIENT)
Dept: SURGERY | Age: 65
End: 2020-07-16

## 2020-07-16 NOTE — TELEPHONE ENCOUNTER
----- Message from Teresa Guerra MD sent at 5/15/2020  8:27 PM EDT -----  Regarding: RE: [address]! We can try to do a virtual visit with him on Monday afternoon and then wait for the results for the Slow Mohs sample. I don't believe it should be a problem to get him on the schedule after final clear margins. Thanks!! Catrachita Lewis  ----- Message -----  From: Radha Atkins  Sent: 5/15/2020  12:12 PM EDT  To: Lawrence Hernandez MA, Patricio Hearn MD, #  Subject: [facility] REGARDING                                HI Team Mukesh Cotto! Dr. Uriel Holt wanted me to reach out to your office regarding Mr. Delvis Lema to schedule him for a consultation. Dr. Uriel Holt will be arranging a Slow Mohs on this pt where for example we see him for first stage removal on a Monday and then wait for result sometime Tuesday to deterime if we need to take another stage on Wed or if he needs his closure on a Wed. Of course we would schedule him in association with Dr. Arturo Woods surgery schedule. We can talk more about that. His diagnosis is below see lab tab for more details & see media tab for his photo of the L lateral vashti. I have not yet spoke with the patient yet so if you can give me a tentative consult date that would be helpful; Thank you,   Fadumo Olmstead       Left lateral vashti-   Consistent with early melanoma in situ, not excised   In addition to basal layer hypermelanosis there are   scattered, slightly enlarged, hyperchromatic single cell   melanocytes in the basal layer and overlying epidermis that   also extend into the follicular epithelium and are   highlighted by a Sox-10 immunohistochemical stain. While   the single cell melanocytes are not as numerous as in the   prior biopsy, [id number], the changes are consistent   with early melanoma in situ. The lesion extends to a   peripheral margin. Multiple deeper cuts were done.    Immunohistochemistry was performed at [facility] and all   controls stained appropriately. Dr. Molly Christian also reviewed   the case and concurs with the diagnosis.

## 2020-07-16 NOTE — TELEPHONE ENCOUNTER
Contacted patient this morning to discuss scheduling Mohs to look at dates and waiting for a return call.

## 2020-07-16 NOTE — TELEPHONE ENCOUNTER
LVM for pt to return call to schedule Staged Excision with reconstruction with Dr. Josiah Varner. Requested pt to return call about scheduling.

## 2020-11-17 NOTE — TELEPHONE ENCOUNTER
ON 81/84/4602 - SENT CERTIFIED LETTER TO PT REMINDING HIM TO SCHEDULE MOHS W/CLOSURE BY DR Alon Sargent

## 2020-12-04 ENCOUNTER — TELEPHONE (OUTPATIENT)
Dept: DERMATOLOGY | Age: 65
End: 2020-12-04

## 2020-12-04 NOTE — TELEPHONE ENCOUNTER
----- Message from Shayna Varela MD sent at 11/23/2020  8:25 PM EST -----  Regarding: [facility] patient with melanoma needs follow up.  Please call and document

## 2020-12-04 NOTE — TELEPHONE ENCOUNTER
Called patient the patient did not answer. Left a message for patient to call back and schedule a follow up visit.

## 2020-12-16 ENCOUNTER — TELEPHONE (OUTPATIENT)
Dept: DERMATOLOGY | Age: 65
End: 2020-12-16

## 2020-12-16 NOTE — TELEPHONE ENCOUNTER
Call and spoke with patient. Patient stated that he will not be scheduling a follow up. He had to sell his house and relocate to [facility] [place] [facility]. Patient appreciated the follow up call.

## 2020-12-16 NOTE — TELEPHONE ENCOUNTER
----- Message from Hernando Zuñiga MD sent at 11/23/2020  8:25 PM EST -----  Regarding: Kayce Turner patient with melanoma needs follow up.  Please call and document

## (undated) DEVICE — SUTURE CHROMIC GUT SZ 4-0 L27IN ABSRB BRN L17MM RB-1 1/2 U203H

## (undated) DEVICE — SPONGE,LAP,4"X18",XR,ST,5/PK,40PK/CS: Brand: MEDLINE INDUSTRIES, INC.

## (undated) DEVICE — GOWN,SIRUS,NON REINFRCD,LARGE,SET IN SL: Brand: MEDLINE

## (undated) DEVICE — GLOVE SURG SZ 65 L12IN FNGR THK94MIL STD WHT LTX FREE

## (undated) DEVICE — SUTURE VCRL SZ 3-0 L18IN ABSRB UD PS-2 L19MM 1/2 CIR J497G

## (undated) DEVICE — SET GRAV VENT NVENT CK VLV 3 NDL FREE PRT 10 GTT

## (undated) DEVICE — SET ADMIN PRIMING 7ML L30IN 7.35LB 20 GTT 2ND RLER CLMP

## (undated) DEVICE — SHEET,DRAPE,53X77,STERILE: Brand: MEDLINE

## (undated) DEVICE — BUR DENT 1.4 MM RND ROSEBUD HNDPC CARBIDE NS

## (undated) DEVICE — INTENDED FOR TISSUE SEPARATION, AND OTHER PROCEDURES THAT REQUIRE A SHARP SURGICAL BLADE TO PUNCTURE OR CUT.: Brand: BARD-PARKER ® STAINLESS STEEL BLADES

## (undated) DEVICE — SOLUTION IV 1000ML LAC RINGERS PH 6.5 INJ USP VIAFLX PLAS

## (undated) DEVICE — 1200CC HIFLOW SUCTION CANISTER WITH AEROSTAT FILTER, FLOAT VALVE SHUTOFF WITH GREEN LID: Brand: BEMIS

## (undated) DEVICE — YANKAUER,BULB TIP,W/O VENT,RIGID,STERILE: Brand: MEDLINE

## (undated) DEVICE — JAW BRA SURG TMJ VELC CLSR SYS V CUT SUPP STRP 2 ZIPLOK BG

## (undated) DEVICE — CATHETER IV 20GA L1.25IN PNK FEP SFTY STR HUB RADPQ DISP

## (undated) DEVICE — BUR OPERATORY CARBIDES RND HNDPC STD 8

## (undated) DEVICE — MEDI-VAC NON-CONDUCTIVE SUCTION TUBING: Brand: CARDINAL HEALTH

## (undated) DEVICE — SPONGE,NEURO,1"X3",XR,STRL,LF,10/PK: Brand: MEDLINE

## (undated) DEVICE — MINOR SET UP PK

## (undated) DEVICE — GLOVE,SURG,SENSICARE,ALOE,LF,PF,7: Brand: MEDLINE

## (undated) DEVICE — SUTURE PLN GUT 3-0 L18IN ABSRB X-1 L22MM 1/2 CIR REV CUT 612G

## (undated) DEVICE — SYRINGE,EAR/ULCER, 2 OZ, STERILE: Brand: MEDLINE

## (undated) DEVICE — SOLUTION IV IRRIG 500ML 0.9% SODIUM CHL 2F7123

## (undated) DEVICE — 3M™ TEGADERM™ TRANSPARENT FILM DRESSING FRAME STYLE, 1624W, 2-3/8 IN X 2-3/4 IN (6 CM X 7 CM), 100/CT 4CT/CASE: Brand: 3M™ TEGADERM™

## (undated) DEVICE — GLOVE SURG SZ 75 L12IN FNGR THK94MIL STD WHT LTX FREE